# Patient Record
Sex: MALE | Race: WHITE | Employment: FULL TIME | URBAN - METROPOLITAN AREA
[De-identification: names, ages, dates, MRNs, and addresses within clinical notes are randomized per-mention and may not be internally consistent; named-entity substitution may affect disease eponyms.]

---

## 2021-01-26 ENCOUNTER — HOSPITAL ENCOUNTER (EMERGENCY)
Age: 36
Discharge: HOME | End: 2021-01-26
Attending: EMERGENCY MEDICINE

## 2021-01-26 ENCOUNTER — APPOINTMENT (OUTPATIENT)
Dept: GENERAL RADIOLOGY | Age: 36
End: 2021-01-26

## 2021-01-26 VITALS
SYSTOLIC BLOOD PRESSURE: 125 MMHG | HEART RATE: 78 BPM | HEIGHT: 70 IN | TEMPERATURE: 98.2 F | RESPIRATION RATE: 16 BRPM | BODY MASS INDEX: 25.77 KG/M2 | DIASTOLIC BLOOD PRESSURE: 83 MMHG | OXYGEN SATURATION: 99 % | WEIGHT: 180 LBS

## 2021-01-26 DIAGNOSIS — S39.012A STRAIN OF LUMBAR REGION, INITIAL ENCOUNTER: Primary | ICD-10-CM

## 2021-01-26 PROCEDURE — 72100 X-RAY EXAM L-S SPINE 2/3 VWS: CPT

## 2021-01-26 PROCEDURE — 6360000002 HC RX W HCPCS: Performed by: EMERGENCY MEDICINE

## 2021-01-26 PROCEDURE — 96372 THER/PROPH/DIAG INJ SC/IM: CPT

## 2021-01-26 PROCEDURE — 6370000000 HC RX 637 (ALT 250 FOR IP): Performed by: EMERGENCY MEDICINE

## 2021-01-26 PROCEDURE — 99283 EMERGENCY DEPT VISIT LOW MDM: CPT

## 2021-01-26 PROCEDURE — 72170 X-RAY EXAM OF PELVIS: CPT

## 2021-01-26 RX ORDER — KETOROLAC TROMETHAMINE 30 MG/ML
30 INJECTION, SOLUTION INTRAMUSCULAR; INTRAVENOUS ONCE
Status: COMPLETED | OUTPATIENT
Start: 2021-01-26 | End: 2021-01-26

## 2021-01-26 RX ORDER — METHOCARBAMOL 500 MG/1
500 TABLET, FILM COATED ORAL 3 TIMES DAILY PRN
Qty: 10 TABLET | Refills: 0 | Status: SHIPPED | OUTPATIENT
Start: 2021-01-26

## 2021-01-26 RX ORDER — CETIRIZINE HYDROCHLORIDE 10 MG/1
TABLET ORAL DAILY
COMMUNITY

## 2021-01-26 RX ORDER — LIDOCAINE 4 G/G
1 PATCH TOPICAL DAILY
Qty: 10 PATCH | Refills: 0 | Status: SHIPPED | OUTPATIENT
Start: 2021-01-26 | End: 2021-02-05

## 2021-01-26 RX ORDER — LIDOCAINE 4 G/G
1 PATCH TOPICAL ONCE
Status: DISCONTINUED | OUTPATIENT
Start: 2021-01-26 | End: 2021-01-26 | Stop reason: HOSPADM

## 2021-01-26 RX ADMIN — KETOROLAC TROMETHAMINE 30 MG: 30 INJECTION, SOLUTION INTRAMUSCULAR at 10:29

## 2021-01-26 ASSESSMENT — PAIN DESCRIPTION - DESCRIPTORS: DESCRIPTORS: DULL

## 2021-01-26 ASSESSMENT — PAIN DESCRIPTION - LOCATION: LOCATION: BACK

## 2021-01-26 ASSESSMENT — PAIN SCALES - GENERAL: PAINLEVEL_OUTOF10: 7

## 2021-01-26 NOTE — ED TRIAGE NOTES
Chief Complaint   Patient presents with    Back Pain     pt c/o lower back pain x 6 days, denies injury

## 2021-01-26 NOTE — ED PROVIDER NOTES
[address] ED      CHIEF COMPLAINT  Back Pain (pt c/o lower back pain x 6 days, denies injury)       HISTORY OF PRESENT ILLNESS  Freddy Comer is a 28 y.o. male  who presents to the ED for evaluation of back pain. Patient reports he is on his feet a lot coke and says he is not having worsening bilateral lower back pain over the past 6 days. Denies recalling any specific injuries. Reports the pain is worse with movement and prolonged standing. Reports having experienced back pain before, but never this bad. Denies having diabetes. Denies IV drug use. Denies any blood thinner use. Denies having saddle anesthesia, urinary, or bowel incontinence. Denies any recent fevers, chills. Denies any unintentional weight loss. Denies night chills. Denies having any other complaints such as chest pain, shortness of breath, abdominal pain, or any other symptoms. Denies hematuria, dysuria, or urinary frequency/urgency. No other complaints, modifying factors or associated symptoms. I have reviewed the following from the nursing documentation. History reviewed. No pertinent past medical history. Past Surgical History:   Procedure Laterality Date    SKIN GRAFT      VASECTOMY       History reviewed. No pertinent family history. Social History     Socioeconomic History    Marital status: Single     Spouse name: Not on file    Number of children: Not on file    Years of education: Not on file    Highest education level: Not on file   Occupational History    Not on file   Social Needs    Financial resource strain: Not on file    Food insecurity     Worry: Not on file     Inability: Not on file    Transportation needs     Medical: Not on file     Non-medical: Not on file   Tobacco Use    Smoking status: Current Every Day Smoker     Packs/day: 0.50     Types: Cigarettes    Smokeless tobacco: Never Used   Substance and Sexual Activity    Alcohol use: Yes     Comment: daily    Drug use:  Yes Types: Marijuana    Sexual activity: Not on file   Lifestyle    Physical activity     Days per week: Not on file     Minutes per session: Not on file    Stress: Not on file   Relationships    Social connections     Talks on phone: Not on file     Gets together: Not on file     Attends Caodaism service: Not on file     Active member of club or organization: Not on file     Attends meetings of clubs or organizations: Not on file     Relationship status: Not on file    Intimate partner violence     Fear of current or ex partner: Not on file     Emotionally abused: Not on file     Physically abused: Not on file     Forced sexual activity: Not on file   Other Topics Concern    Not on file   Social History Narrative    Not on file     No current facility-administered medications for this encounter. Current Outpatient Medications   Medication Sig Dispense Refill    cetirizine (ZYRTEC) 10 MG tablet Take by mouth daily Pt unsure of dose      methocarbamol (ROBAXIN) 500 MG tablet Take 1 tablet by mouth 3 times daily as needed (pain) 10 tablet 0    lidocaine 4 % external patch Place 1 patch onto the skin daily for 10 doses 10 patch 0    albuterol (PROVENTIL HFA;VENTOLIN HFA) 108 (90 BASE) MCG/ACT inhaler Inhale 2 puffs into the lungs every 6 hours as needed. No Known Allergies    REVIEW OF SYSTEMS  10 systems reviewed, pertinent positives per HPI otherwise noted to be negative. PHYSICAL EXAM  /83   Pulse 78   Temp 98.2 °F (36.8 °C) (Oral)   Resp 16   Ht 5' 10\" (1.778 m)   Wt 180 lb (81.6 kg)   SpO2 99%   BMI 25.83 kg/m²    GENERAL APPEARANCE: Awake and alert. No acute distress. HENT: Normocephalic. Atraumatic. PERRL. EOMI. No facial droop. HEART/CHEST: RRR. LUNGS: Respirations unlabored. Speaking comfortably in full sentences. CTAB. ABDOMEN: Soft, non-distended abdomen. Non tender to palpation. No guarding. No rebound. EXTREMITIES: No gross deformities.  Moving all extremities. BACK: tenderness to palpation over bilateral SI. No midline c/t/l spine stepoffs  SKIN: Warm and dry. No acute rashes. NEUROLOGICAL: Alert and oriented. No gross facial drooping. Answering questions appropriately. Moving all extremities. PSYCHIATRIC: Pleasant. Normal mood and affect. LABS  No results found for this visit on 01/26/21. RADIOLOGY  Xr Lumbar Spine (2-3 Views)    Result Date: 1/26/2021  EXAMINATION: THREE XRAY VIEWS OF THE LUMBAR SPINE; ONE XRAY VIEW OF THE PELVIS 1/26/2021 10:34 am COMPARISON: None. HISTORY: ORDERING SYSTEM PROVIDED HISTORY: lower back pain TECHNOLOGIST PROVIDED HISTORY: Reason for exam:->lower back pain Reason for Exam: back pain since Wednesday no injury Acuity: Acute Type of Exam: Initial; ORDERING SYSTEM PROVIDED HISTORY: back pain TECHNOLOGIST PROVIDED HISTORY: Reason for exam:->back pain Reason for Exam: back pain since Wednesday no injury Acuity: Acute Type of Exam: Initial FINDINGS: Lumbar spine: There is straightening of the normal curvature of the spine with preserved height and alignment. Intervertebral disc spaces preserved. Pelvis: Bony pelvis is intact. No significant degenerative changes noted. Evaluation of the sacrum is somewhat limited secondary to overlying fecal material and bowel gas. No obvious acute osseous abnormality noted. Straightening of the normal curvature of the spine which can be seen with strain or spasm. No obvious acute osseous abnormality noted. If the patient has radicular symptoms MRI would be more sensitive for evaluation of discogenic and nerve root involvement. The pelvis demonstrates no obvious acute abnormality. Evaluation of the sacrum is somewhat limited by overlying bowel gas and fecal material. If there is focal tenderness of the mid to inferior sacrum additional imaging can be obtained as clinically indicated.      Xr Pelvis (1-2 Views)    Result Date: 1/26/2021  EXAMINATION: THREE XRAY VIEWS OF THE LUMBAR SPINE; ONE XRAY VIEW OF THE PELVIS 1/26/2021 10:34 am COMPARISON: None. HISTORY: ORDERING SYSTEM PROVIDED HISTORY: lower back pain TECHNOLOGIST PROVIDED HISTORY: Reason for exam:->lower back pain Reason for Exam: back pain since Wednesday no injury Acuity: Acute Type of Exam: Initial; ORDERING SYSTEM PROVIDED HISTORY: back pain TECHNOLOGIST PROVIDED HISTORY: Reason for exam:->back pain Reason for Exam: back pain since Wednesday no injury Acuity: Acute Type of Exam: Initial FINDINGS: Lumbar spine: There is straightening of the normal curvature of the spine with preserved height and alignment. Intervertebral disc spaces preserved. Pelvis: Bony pelvis is intact. No significant degenerative changes noted. Evaluation of the sacrum is somewhat limited secondary to overlying fecal material and bowel gas. No obvious acute osseous abnormality noted. Straightening of the normal curvature of the spine which can be seen with strain or spasm. No obvious acute osseous abnormality noted. If the patient has radicular symptoms MRI would be more sensitive for evaluation of discogenic and nerve root involvement. The pelvis demonstrates no obvious acute abnormality. Evaluation of the sacrum is somewhat limited by overlying bowel gas and fecal material. If there is focal tenderness of the mid to inferior sacrum additional imaging can be obtained as clinically indicated. ED COURSE/MDM  Patient seen and evaluated. At presentation, patient was alert, afebrile, hemodynamically stable, and satting well on room air. Exam remarkable for tenderness palpation over bilateral SI joint. Patient reported having pain worse with prolonged standing and movement with occasional shooting pain down the back of his legs or buttocks to the mid thigh. Discussed his symptoms are consistent with sciatica. Patient denies having any red flag symptoms and therefore low risk for cauda equina.   Patient also denies any injuries to make me suspect compression fractures or acute osseous deformity. Patient denies any unintentional weight loss with history of cancer and therefore at low risk for pathologic fracture. Given this, x-ray of lumbar spine and x-ray pelvis obtained. Patient given Toradol and lidocaine patch in the emergency department. X-ray showed no acute deformity. Patient given prescription of methocarbamol, lidocaine patches, and given referral for PCP. Patient discharged home with strict return precautions. Pt was seen during the [place] 19 pandemic. Appropriate PPE worn by ME during patient encounters. Pt seen during a time with constrained hospital bed capacity and other potential inpatient and outpatient resources were constrained due to the viral pandemic. During the patient's ED course, the patient was given:  Medications   ketorolac (TORADOL) injection 30 mg (30 mg Intramuscular Given 1/26/21 1029)        CLINICAL IMPRESSION  1. Strain of lumbar region, initial encounter        Blood pressure 125/83, pulse 78, temperature 98.2 °F (36.8 °C), temperature source Oral, resp. rate 16, height 5' 10\" (1.778 m), weight 180 lb (81.6 kg), SpO2 99 %. Jodi Mulligan was discharged home in stable condition. Patient was given scripts for the following medications. I counseled patient how to take these medications. Discharge Medication List as of 1/26/2021 11:42 AM      START taking these medications    Details   methocarbamol (ROBAXIN) 500 MG tablet Take 1 tablet by mouth 3 times daily as needed (pain), Disp-10 tablet, R-0Print      lidocaine 4 % external patch Place 1 patch onto the skin daily for 10 doses, Transdermal, DAILY Starting Tue 1/26/2021, Until Fri 2/5/2021, For 10 doses, Disp-10 patch, R-0, Print             Follow-up with:  Damir Medina  [phone]          DISCLAIMER: This chart was created using Dragon dictation software.   Efforts were made by me to ensure accuracy, however some errors may

## 2023-03-08 ENCOUNTER — APPOINTMENT (OUTPATIENT)
Dept: CT IMAGING | Age: 38
End: 2023-03-08

## 2023-03-08 ENCOUNTER — APPOINTMENT (OUTPATIENT)
Dept: GENERAL RADIOLOGY | Age: 38
End: 2023-03-08

## 2023-03-08 ENCOUNTER — HOSPITAL ENCOUNTER (INPATIENT)
Age: 38
LOS: 2 days | Discharge: HOME | End: 2023-03-10
Attending: EMERGENCY MEDICINE | Admitting: INTERNAL MEDICINE

## 2023-03-08 DIAGNOSIS — R03.0 ELEVATED BLOOD-PRESSURE READING WITHOUT DIAGNOSIS OF HYPERTENSION: ICD-10-CM

## 2023-03-08 DIAGNOSIS — F17.219 CIGARETTE NICOTINE DEPENDENCE WITH NICOTINE-INDUCED DISORDER: ICD-10-CM

## 2023-03-08 DIAGNOSIS — I50.9 ACUTE CONGESTIVE HEART FAILURE, UNSPECIFIED HEART FAILURE TYPE (HCC): Primary | ICD-10-CM

## 2023-03-08 DIAGNOSIS — L03.116 CELLULITIS OF LEFT LOWER EXTREMITY: ICD-10-CM

## 2023-03-08 DIAGNOSIS — J81.0 ACUTE PULMONARY EDEMA (HCC): ICD-10-CM

## 2023-03-08 PROBLEM — I50.43 CHF (CONGESTIVE HEART FAILURE), NYHA CLASS I, ACUTE ON CHRONIC, COMBINED (HCC): Status: ACTIVE | Noted: 2023-03-08

## 2023-03-08 LAB
A/G RATIO: 0.9 (ref 1.1–2.2)
ALBUMIN SERPL-MCNC: 3.6 G/DL (ref 3.4–5)
ALP BLD-CCNC: 77 U/L (ref 40–129)
ALT SERPL-CCNC: 19 U/L (ref 10–40)
ANION GAP SERPL CALCULATED.3IONS-SCNC: 12 MMOL/L (ref 3–16)
AST SERPL-CCNC: 18 U/L (ref 15–37)
BASOPHILS ABSOLUTE: 0.1 K/UL (ref 0–0.2)
BASOPHILS RELATIVE PERCENT: 1 %
BILIRUB SERPL-MCNC: 0.3 MG/DL (ref 0–1)
BUN BLDV-MCNC: 13 MG/DL (ref 7–20)
CALCIUM SERPL-MCNC: 9.2 MG/DL (ref 8.3–10.6)
CHLORIDE BLD-SCNC: 97 MMOL/L (ref 99–110)
CO2: 23 MMOL/L (ref 21–32)
CREAT SERPL-MCNC: 0.9 MG/DL (ref 0.9–1.3)
D DIMER: 3.16 UG/ML FEU (ref 0–0.6)
EKG ATRIAL RATE: 93 BPM
EKG DIAGNOSIS: NORMAL
EKG P AXIS: 42 DEGREES
EKG P-R INTERVAL: 148 MS
EKG Q-T INTERVAL: 362 MS
EKG QRS DURATION: 96 MS
EKG QTC CALCULATION (BAZETT): 450 MS
EKG R AXIS: -24 DEGREES
EKG T AXIS: -3 DEGREES
EKG VENTRICULAR RATE: 93 BPM
EOSINOPHILS ABSOLUTE: 0.2 K/UL (ref 0–0.6)
EOSINOPHILS RELATIVE PERCENT: 1.5 %
GFR SERPL CREATININE-BSD FRML MDRD: >60 ML/MIN/{1.73_M2}
GLUCOSE BLD-MCNC: 145 MG/DL (ref 70–99)
HCT VFR BLD CALC: 35.3 % (ref 40.5–52.5)
HEMOGLOBIN: 11.8 G/DL (ref 13.5–17.5)
INFLUENZA A: NOT DETECTED
INFLUENZA B: NOT DETECTED
LYMPHOCYTES ABSOLUTE: 1.3 K/UL (ref 1–5.1)
LYMPHOCYTES RELATIVE PERCENT: 11.8 %
MCH RBC QN AUTO: 30.8 PG (ref 26–34)
MCHC RBC AUTO-ENTMCNC: 33.4 G/DL (ref 31–36)
MCV RBC AUTO: 92.3 FL (ref 80–100)
MONOCYTES ABSOLUTE: 0.9 K/UL (ref 0–1.3)
MONOCYTES RELATIVE PERCENT: 8.6 %
NEUTROPHILS ABSOLUTE: 8.3 K/UL (ref 1.7–7.7)
NEUTROPHILS RELATIVE PERCENT: 77.1 %
PDW BLD-RTO: 13.1 % (ref 12.4–15.4)
PLATELET # BLD: 393 K/UL (ref 135–450)
PMV BLD AUTO: 8.8 FL (ref 5–10.5)
POTASSIUM SERPL-SCNC: 3.8 MMOL/L (ref 3.5–5.1)
PRO-BNP: 4658 PG/ML (ref 0–124)
RBC # BLD: 3.83 M/UL (ref 4.2–5.9)
SARS-COV-2 RNA, RT PCR: NOT DETECTED
SODIUM BLD-SCNC: 132 MMOL/L (ref 136–145)
TOTAL PROTEIN: 7.4 G/DL (ref 6.4–8.2)
TROPONIN: <0.01 NG/ML
WBC # BLD: 10.8 K/UL (ref 4–11)

## 2023-03-08 PROCEDURE — 36415 COLL VENOUS BLD VENIPUNCTURE: CPT

## 2023-03-08 PROCEDURE — 2580000003 HC RX 258: Performed by: INTERNAL MEDICINE

## 2023-03-08 PROCEDURE — 6360000002 HC RX W HCPCS: Performed by: INTERNAL MEDICINE

## 2023-03-08 PROCEDURE — 84484 ASSAY OF TROPONIN QUANT: CPT

## 2023-03-08 PROCEDURE — 6360000002 HC RX W HCPCS: Performed by: NURSE PRACTITIONER

## 2023-03-08 PROCEDURE — 71260 CT THORAX DX C+: CPT | Performed by: NURSE PRACTITIONER

## 2023-03-08 PROCEDURE — 1200000000 HC SEMI PRIVATE

## 2023-03-08 PROCEDURE — 6360000004 HC RX CONTRAST MEDICATION: Performed by: NURSE PRACTITIONER

## 2023-03-08 PROCEDURE — 6370000000 HC RX 637 (ALT 250 FOR IP): Performed by: NURSE PRACTITIONER

## 2023-03-08 PROCEDURE — 87636 SARSCOV2 & INF A&B AMP PRB: CPT

## 2023-03-08 PROCEDURE — 6370000000 HC RX 637 (ALT 250 FOR IP): Performed by: INTERNAL MEDICINE

## 2023-03-08 PROCEDURE — 96365 THER/PROPH/DIAG IV INF INIT: CPT

## 2023-03-08 PROCEDURE — 71046 X-RAY EXAM CHEST 2 VIEWS: CPT

## 2023-03-08 PROCEDURE — 85025 COMPLETE CBC W/AUTO DIFF WBC: CPT

## 2023-03-08 PROCEDURE — 93010 ELECTROCARDIOGRAM REPORT: CPT | Performed by: INTERNAL MEDICINE

## 2023-03-08 PROCEDURE — 94640 AIRWAY INHALATION TREATMENT: CPT

## 2023-03-08 PROCEDURE — 93005 ELECTROCARDIOGRAM TRACING: CPT | Performed by: NURSE PRACTITIONER

## 2023-03-08 PROCEDURE — 80053 COMPREHEN METABOLIC PANEL: CPT

## 2023-03-08 PROCEDURE — 99285 EMERGENCY DEPT VISIT HI MDM: CPT

## 2023-03-08 PROCEDURE — 85379 FIBRIN DEGRADATION QUANT: CPT

## 2023-03-08 PROCEDURE — 96375 TX/PRO/DX INJ NEW DRUG ADDON: CPT

## 2023-03-08 PROCEDURE — 83880 ASSAY OF NATRIURETIC PEPTIDE: CPT

## 2023-03-08 RX ORDER — SODIUM CHLORIDE 9 MG/ML
INJECTION, SOLUTION INTRAVENOUS PRN
Status: DISCONTINUED | OUTPATIENT
Start: 2023-03-08 | End: 2023-03-10 | Stop reason: HOSPADM

## 2023-03-08 RX ORDER — FUROSEMIDE 10 MG/ML
20 INJECTION INTRAMUSCULAR; INTRAVENOUS ONCE
Status: COMPLETED | OUTPATIENT
Start: 2023-03-08 | End: 2023-03-08

## 2023-03-08 RX ORDER — CEFAZOLIN SODIUM IN 0.9 % NACL 2 G/100 ML
2000 PLASTIC BAG, INJECTION (ML) INTRAVENOUS ONCE
Status: COMPLETED | OUTPATIENT
Start: 2023-03-08 | End: 2023-03-08

## 2023-03-08 RX ORDER — ONDANSETRON 2 MG/ML
4 INJECTION INTRAMUSCULAR; INTRAVENOUS EVERY 6 HOURS PRN
Status: DISCONTINUED | OUTPATIENT
Start: 2023-03-08 | End: 2023-03-10 | Stop reason: HOSPADM

## 2023-03-08 RX ORDER — SODIUM CHLORIDE 0.9 % (FLUSH) 0.9 %
5-40 SYRINGE (ML) INJECTION EVERY 12 HOURS SCHEDULED
Status: DISCONTINUED | OUTPATIENT
Start: 2023-03-08 | End: 2023-03-10 | Stop reason: HOSPADM

## 2023-03-08 RX ORDER — ALBUTEROL SULFATE 90 UG/1
2 AEROSOL, METERED RESPIRATORY (INHALATION) EVERY 4 HOURS PRN
Status: DISCONTINUED | OUTPATIENT
Start: 2023-03-08 | End: 2023-03-10 | Stop reason: HOSPADM

## 2023-03-08 RX ORDER — ACETAMINOPHEN 650 MG/1
650 SUPPOSITORY RECTAL EVERY 6 HOURS PRN
Status: DISCONTINUED | OUTPATIENT
Start: 2023-03-08 | End: 2023-03-10 | Stop reason: HOSPADM

## 2023-03-08 RX ORDER — POLYETHYLENE GLYCOL 3350 17 G/17G
17 POWDER, FOR SOLUTION ORAL DAILY PRN
Status: DISCONTINUED | OUTPATIENT
Start: 2023-03-08 | End: 2023-03-10 | Stop reason: HOSPADM

## 2023-03-08 RX ORDER — ENOXAPARIN SODIUM 100 MG/ML
40 INJECTION SUBCUTANEOUS DAILY
Status: DISCONTINUED | OUTPATIENT
Start: 2023-03-08 | End: 2023-03-10 | Stop reason: HOSPADM

## 2023-03-08 RX ORDER — SODIUM CHLORIDE 0.9 % (FLUSH) 0.9 %
5-40 SYRINGE (ML) INJECTION PRN
Status: DISCONTINUED | OUTPATIENT
Start: 2023-03-08 | End: 2023-03-10 | Stop reason: HOSPADM

## 2023-03-08 RX ORDER — FUROSEMIDE 10 MG/ML
40 INJECTION INTRAMUSCULAR; INTRAVENOUS 3 TIMES DAILY
Status: COMPLETED | OUTPATIENT
Start: 2023-03-08 | End: 2023-03-08

## 2023-03-08 RX ORDER — ONDANSETRON 4 MG/1
4 TABLET, ORALLY DISINTEGRATING ORAL EVERY 8 HOURS PRN
Status: DISCONTINUED | OUTPATIENT
Start: 2023-03-08 | End: 2023-03-10 | Stop reason: HOSPADM

## 2023-03-08 RX ORDER — IPRATROPIUM BROMIDE AND ALBUTEROL SULFATE 2.5; .5 MG/3ML; MG/3ML
1 SOLUTION RESPIRATORY (INHALATION) ONCE
Status: COMPLETED | OUTPATIENT
Start: 2023-03-08 | End: 2023-03-08

## 2023-03-08 RX ORDER — ACETAMINOPHEN 325 MG/1
650 TABLET ORAL EVERY 6 HOURS PRN
Status: DISCONTINUED | OUTPATIENT
Start: 2023-03-08 | End: 2023-03-10 | Stop reason: HOSPADM

## 2023-03-08 RX ADMIN — FUROSEMIDE 40 MG: 10 INJECTION, SOLUTION INTRAMUSCULAR; INTRAVENOUS at 20:35

## 2023-03-08 RX ADMIN — IPRATROPIUM BROMIDE AND ALBUTEROL SULFATE 1 AMPULE: .5; 2.5 SOLUTION RESPIRATORY (INHALATION) at 10:00

## 2023-03-08 RX ADMIN — ACETAMINOPHEN 650 MG: 325 TABLET ORAL at 20:41

## 2023-03-08 RX ADMIN — Medication 2000 MG: at 13:49

## 2023-03-08 RX ADMIN — Medication 10 ML: at 20:35

## 2023-03-08 RX ADMIN — FUROSEMIDE 20 MG: 10 INJECTION, SOLUTION INTRAMUSCULAR; INTRAVENOUS at 11:09

## 2023-03-08 RX ADMIN — ENOXAPARIN SODIUM 40 MG: 100 INJECTION SUBCUTANEOUS at 16:34

## 2023-03-08 RX ADMIN — FUROSEMIDE 40 MG: 10 INJECTION, SOLUTION INTRAMUSCULAR; INTRAVENOUS at 16:34

## 2023-03-08 RX ADMIN — IOPAMIDOL 75 ML: 755 INJECTION, SOLUTION INTRAVENOUS at 12:57

## 2023-03-08 ASSESSMENT — PAIN SCALES - GENERAL: PAINLEVEL_OUTOF10: 2

## 2023-03-08 ASSESSMENT — PAIN - FUNCTIONAL ASSESSMENT: PAIN_FUNCTIONAL_ASSESSMENT: NONE - DENIES PAIN

## 2023-03-08 NOTE — PROGRESS NOTES
Patient admitted to room 354 from ER. Patient oriented to room, call light, bed rails, phone, lights and bathroom. Patient instructed about the schedule of the day including: vital sign frequency, lab draws, possible tests, frequency of MD and staff rounds, including RN/MD rounding together at bedside, daily weights, and I &O's. Patient instructed about prescribed diet, how to use 8MENU, and television. Bed locked, in lowest position, side rails up 2/4, call light within reach. Will continue to monitor.

## 2023-03-08 NOTE — PLAN OF CARE
Received PS from ED regarding Possible Admission , Forwarded Message to  Dr. Abiodun Ladd MD  Hospitalist Physician

## 2023-03-08 NOTE — RT PROTOCOL NOTE
RT Inhaler-Nebulizer Bronchodilator Protocol Note    There is a bronchodilator order in the chart from a provider indicating to follow the RT Bronchodilator Protocol and there is an Initiate RT Inhaler-Nebulizer Bronchodilator Protocol order as well (see protocol at bottom of note). CXR Findings:  No results found. The findings from the last RT Protocol Assessment were as follows:   History Pulmonary Disease: None or smoker <15 pack years  Respiratory Pattern: Regular pattern and RR 12-20 bpm  Breath Sounds: Slightly diminished and/or crackles  Cough: Strong, spontaneous, non-productive  Indication for Bronchodilator Therapy: On home bronchodilators  Bronchodilator Assessment Score: 2    Aerosolized bronchodilator medication orders have been revised according to the RT Inhaler-Nebulizer Bronchodilator Protocol below. Respiratory Therapist to perform RT Therapy Protocol Assessment initially then follow the protocol. Repeat RT Therapy Protocol Assessment PRN for score 0-3 or on second treatment, BID, and PRN for scores above 3. No Indications - adjust the frequency to every 6 hours PRN wheezing or bronchospasm, if no treatments needed after 48 hours then discontinue using Per Protocol order mode. If indication present, adjust the RT bronchodilator orders based on the Bronchodilator Assessment Score as indicated below. Use Inhaler orders unless patient has one or more of the following: on home nebulizer, not able to hold breath for 10 seconds, is not alert and oriented, cannot activate and use MDI correctly, or respiratory rate 25 breaths per minute or more, then use the equivalent nebulizer order(s) with same Frequency and PRN reasons based on the score. If a patient is on this medication at home then do not decrease Frequency below that used at home.     0-3 - enter or revise RT bronchodilator order(s) to equivalent RT Bronchodilator order with Frequency of every 4 hours PRN for wheezing or increased work of breathing using Per Protocol order mode. 4-6 - enter or revise RT Bronchodilator order(s) to two equivalent RT bronchodilator orders with one order with BID Frequency and one order with Frequency of every 4 hours PRN wheezing or increased work of breathing using Per Protocol order mode. 7-10 - enter or revise RT Bronchodilator order(s) to two equivalent RT bronchodilator orders with one order with TID Frequency and one order with Frequency of every 4 hours PRN wheezing or increased work of breathing using Per Protocol order mode. 11-13 - enter or revise RT Bronchodilator order(s) to one equivalent RT bronchodilator order with QID Frequency and an Albuterol order with Frequency of every 4 hours PRN wheezing or increased work of breathing using Per Protocol order mode. Greater than 13 - enter or revise RT Bronchodilator order(s) to one equivalent RT bronchodilator order with every 4 hours Frequency and an Albuterol order with Frequency of every 2 hours PRN wheezing or increased work of breathing using Per Protocol order mode. RT to enter RT Home Evaluation for COPD & MDI Assessment order using Per Protocol order mode.     Electronically signed by Lawanda Dumont RCP on 3/8/2023 at 4:28 PM

## 2023-03-08 NOTE — CARE COORDINATION
Case Management Assessment  Initial Evaluation    Date/Time of Evaluation: 3/8/2023 2:07 PM  Assessment Completed by: SUSI Knight    If patient is discharged prior to next notation, then this note serves as note for discharge by case management. Patient Name: Maximino Sal                   YOB: 1985  Diagnosis: No admission diagnoses are documented for this encounter. Date / Time: 3/8/2023  8:52 AM    Patient Admission Status: Emergency   Readmission Risk (Low < 19, Mod (19-27), High > 27): No data recorded  Current PCP: No primary care provider on file. PCP verified by CM? (P) Yes    Chart Reviewed: Yes      History Provided by: (P) Patient  Patient Orientation: (P) Alert and Oriented    Patient Cognition: (P) Alert    Hospitalization in the last 30 days (Readmission):  No    If yes, Readmission Assessment in CM Navigator will be completed.     Advance Directives:      Code Status: Not on file   Patient's Primary Decision Maker is: (P) Legal Next of Kin    Primary Decision Maker: Roberta Upton [facility] - [phone]    Discharge Planning:    Patient lives with: (P) Alone Type of Home: (P) Apartment  Primary Care Giver: (P) Self  Patient Support Systems include: (P) Parent, Friends/Neighbors, Family Members   Current Financial resources: (P) Financial Counseling  Current community resources: (P) Health Education  Current services prior to admission: (P) None            Current DME:              Type of Home Care services:       ADLS  Prior functional level: (P) Independent in ADLs/IADLs  Current functional level: (P) Independent in ADLs/IADLs    PT AM-PAC:   /24  OT AM-PAC:   /24    Family can provide assistance at DC: (P) Yes  Would you like Case Management to discuss the discharge plan with any other family members/significant others, and if so, who? (P) No  Plans to Return to Present Housing: (P) Yes  Other Identified Issues/Barriers to RETURNING to current housing: none noted  Potential Assistance needed at discharge: (P) Prescription Assistance            Potential DME:    Patient expects to discharge to: (P) [address] [facility] [place] for transportation at discharge:      Financial    Payor: /     Does insurance require precert for SNF: No    Potential assistance Purchasing Medications: (P) Yes  Meds-to-Beds request:        [facility] [id number], [address] [facility] [phone] - F [phone] [address] [facility] [address]  Phone: [phone] Fax: [phone]      Notes:    Factors facilitating achievement of predicted outcomes: Family support, Motivated, Cooperative, and Pleasant    Barriers to discharge: Medical complications and No insurance coverage    Additional Case Management Notes: Referred to patient for d/c planning. Spoke to patient. Patient is a 40year old male admitted for CHF. Patient usually lives at home alone. Patient reports he is independent in ADLs. Patient works but has not gotten insurance yet. Patient with no PCP. Gave information on [facility] and good rx card. Referral also made to financial counselor. Referral to CHF nurse for education. Discussed alcohol and substance use. Patient reports he smokes marijuana 1-2 days and drinks 6 beers daily. Patient denies these as a problem and declined any treatment resources. No other needs at this time. The Plan for Transition of Care is related to the following treatment goals of No admission diagnoses are documented for this encounter. IF APPLICABLE: The Patient and/or patient representative Abram Leyva and his family were provided with a choice of provider and agrees with the discharge plan.  Freedom of choice list with basic dialogue that supports the patient's individualized plan of care/goals and shares the quality data associated with the providers was provided to:     Patient Representative Name:       The Patient and/or Patient Representative Agree with the Discharge Plan?       SUSI Goss, LESLIEW-S  Case Management Department  Ph: [phone] Fax: [phone]

## 2023-03-08 NOTE — RT PROTOCOL NOTE
RT Inhaler-Nebulizer Bronchodilator Protocol Note    There is a bronchodilator order in the chart from a provider indicating to follow the RT Bronchodilator Protocol and there is an Initiate RT Inhaler-Nebulizer Bronchodilator Protocol order as well (see protocol at bottom of note). CXR Findings:  No results found. The findings from the last RT Protocol Assessment were as follows:   History Pulmonary Disease: None or smoker <15 pack years  Respiratory Pattern: Regular pattern and RR 12-20 bpm  Breath Sounds: Slightly diminished and/or crackles  Cough: Strong, spontaneous, non-productive  Indication for Bronchodilator Therapy: On home bronchodilators  Bronchodilator Assessment Score: 2    Aerosolized bronchodilator medication orders have been revised according to the RT Inhaler-Nebulizer Bronchodilator Protocol below. Respiratory Therapist to perform RT Therapy Protocol Assessment initially then follow the protocol. Repeat RT Therapy Protocol Assessment PRN for score 0-3 or on second treatment, BID, and PRN for scores above 3. No Indications - adjust the frequency to every 6 hours PRN wheezing or bronchospasm, if no treatments needed after 48 hours then discontinue using Per Protocol order mode. If indication present, adjust the RT bronchodilator orders based on the Bronchodilator Assessment Score as indicated below. Use Inhaler orders unless patient has one or more of the following: on home nebulizer, not able to hold breath for 10 seconds, is not alert and oriented, cannot activate and use MDI correctly, or respiratory rate 25 breaths per minute or more, then use the equivalent nebulizer order(s) with same Frequency and PRN reasons based on the score. If a patient is on this medication at home then do not decrease Frequency below that used at home.     0-3 - enter or revise RT bronchodilator order(s) to equivalent RT Bronchodilator order with Frequency of every 4 hours PRN for wheezing or increased work of breathing using Per Protocol order mode. 4-6 - enter or revise RT Bronchodilator order(s) to two equivalent RT bronchodilator orders with one order with BID Frequency and one order with Frequency of every 4 hours PRN wheezing or increased work of breathing using Per Protocol order mode. 7-10 - enter or revise RT Bronchodilator order(s) to two equivalent RT bronchodilator orders with one order with TID Frequency and one order with Frequency of every 4 hours PRN wheezing or increased work of breathing using Per Protocol order mode. 11-13 - enter or revise RT Bronchodilator order(s) to one equivalent RT bronchodilator order with QID Frequency and an Albuterol order with Frequency of every 4 hours PRN wheezing or increased work of breathing using Per Protocol order mode. Greater than 13 - enter or revise RT Bronchodilator order(s) to one equivalent RT bronchodilator order with every 4 hours Frequency and an Albuterol order with Frequency of every 2 hours PRN wheezing or increased work of breathing using Per Protocol order mode. RT to enter RT Home Evaluation for COPD & MDI Assessment order using Per Protocol order mode.     Electronically signed by Darshan Yañez RCP on 3/8/2023 at 4:27 PM

## 2023-03-08 NOTE — ACP (ADVANCE CARE PLANNING)
Advance Care Planning     General Advance Care Planning (ACP) Conversation    Date of Conversation: 3/8/2023  Conducted with: Patient with Decision Making Capacity    Healthcare Decision Maker:  No healthcare decision makers have been documented. Click here to complete [address] including selection of the Healthcare Decision Maker Relationship (ie \"Primary\")   Today we documented Decision Maker(s) consistent with Legal Next of Kin hierarchy.     Content/Action Overview:  Has NO ACP documents/care preferences - information provided, considering goals and options          Length of Voluntary ACP Conversation in minutes:  <16 minutes (Non-Billable)    SUSI Rojas

## 2023-03-08 NOTE — PROGRESS NOTES
4 Eyes Skin Assessment     The patient is being assess for  Admission    I agree that 2 RN's have performed a thorough Head to Toe Skin Assessment on the patient. ALL assessment sites listed below have been assessed. Areas assessed by both nurses: Yes  [x]   Head, Face, and Ears   [x]   Shoulders, Back, and Chest  [x]   Arms, Elbows, and Hands   [x]   Coccyx, Sacrum, and Ischum  [x]   Legs, Feet, and Heels, left lower extremity red and swollen        Does the Patient have Skin Breakdown?   No         Maury Prevention initiated:  NA   Wound Care Orders initiated:  NA      [facility] nurse consulted for Pressure Injury (Stage 3,4, Unstageable, DTI, NWPT, and Complex wounds):  NA      Nurse 1 eSignature: Electronically signed by Licha Chin RN on 3/8/23 at 4:15 PM EST    **SHARE this note so that the co-signing nurse is able to place an eSignature**    Nurse 2 eSignature: Electronically signed by Vladislav Love RN on 3/8/23 at 4:50 PM EST

## 2023-03-08 NOTE — PLAN OF CARE
Problem: Discharge Planning  Goal: Discharge to home or other facility with appropriate resources  Outcome: Progressing  Flowsheets (Taken 3/8/2023 1621)  Discharge to home or other facility with appropriate resources:   Identify barriers to discharge with patient and caregiver   Arrange for needed discharge resources and transportation as appropriate   Identify discharge learning needs (meds, wound care, etc)

## 2023-03-08 NOTE — ED NOTES
Pt ambulated independently to the restroom. No Complaints of Shortness of breath or dizziness.      Henna East  03/08/23 103

## 2023-03-08 NOTE — ED PROVIDER NOTES
[address] [place] [address]  ED  [address] [address]  Dept: [phone]  Dept Fax: [phone]  Loc: [facility] [address] [place] [address]    Chief Complaint   Patient presents with    Leg Swelling     Pt reporting bilateral leg swelling for the past several days. States he's recently getting over a chest cold with SOB and has a hx of asthma and is a smoker. Looked up symptoms on the intranet and decided it was important to [employer] checked out\"     Shortness of Breath       HPI    Jerry Escalera is a 40 y.o. male who is presenting to the emergency department with a complaint of leg swelling over the last 2 to 3 weeks. He states that he has a history of asthma and he has been feeling short of breath over the last week or so. He states he is now having difficulty laying flat while he is sleeping as he wakes up short of breath and coughing. He is a cigarette smoker. He denies lightheadedness, dizziness, chest pain, body aches, fever, chills. He denies history of hypertension or heart failure although he has not seen his primary care physician he was at [place] [address] in at least the last 3 or 4 years. REVIEW OF SYSTEMS    Cardiac: no chest pain, no palpitations, no syncope  Respiratory: see HPI, + cough  Neurologic: no syncope or LOC  GI: no vomiting, no diarrhea  General: no fever  All other systems reviewed and are negative.     PAST MEDICAL & SURGICAL HISTORY    Past Medical History:   Diagnosis Date    Asthma      Past Surgical History:   Procedure Laterality Date    SKIN GRAFT      VASECTOMY         CURRENT MEDICATIONS  (may include discharge medications prescribed in the ED)  REM      ALLERGIES    No Known Allergies    SOCIAL & FAMILY HISTORY    Social History     Socioeconomic History    Marital status: Single   Tobacco Use    Smoking status: Every Day     Packs/day: 1.00     Types: Cigarettes    Smokeless tobacco: Never Substance and Sexual Activity    Alcohol use: Yes     Comment: daily    Drug use: Yes     Types: Marijuana (Weed)     No family history on file. PHYSICAL EXAM    VITAL SIGNS: BP (!) 167/100   Pulse 93   Temp 98.1 °F (36.7 °C) (Oral)   Resp 20   Ht 5' 10\" (1.778 m)   Wt 215 lb 3 oz (97.6 kg)   SpO2 98%   BMI 30.88 kg/m²    Constitutional:  Well developed, well nourished, no acute distress   HENT:  Atraumatic, moist mucus membranes  Neck: supple, no JVD   Respiratory: Respirations are even and unlabored. Cough is congested. Nonproductive. Expiratory wheezes throughout. Cardiovascular: Regular rhythm and rate, S1-S2  Vascular: Radial and DP pulses 2+ and equal bilaterally  GI:  Soft, nontender, normal bowel sounds  Musculoskeletal: 3+ pitting bilateral lower extremity edema, no lower extremity asymmetry, no calf tenderness, no thigh tenderness, no acute deformities  Integument:  Skin is warm and dry, no petechiae, he has erythema that has developed to the anterior left ankle it is not circumferential.  It is painful with palpation and the skin appears to be intact. Neurologic:  Alert & oriented, no slurred speech  Psych: Pleasant affect, no hallucinations    EKG    I have reviewed and interpreted today's EKG with the following findings:  Ventricular rate 93 bpm, parable 148 ms, QRS duration 96 ms, QTc 450 ms  No ST elevation or depression. T wave inversion in lead III. Interpretation is a normal sinus rhythm with right incomplete bundle branch block    No old EKG available for comparison    RADIOLOGY/PROCEDURES    CT CHEST PULMONARY EMBOLISM W CONTRAST   Final Result   1. Negative for pulmonary embolus. 2. CHF with pulmonary edema and or atypical multifocal pneumonia.          XR CHEST (2 VW)   Final Result   No acute cardiopulmonary findings           Labs Reviewed   CBC WITH AUTO DIFFERENTIAL - Abnormal; Notable for the following components:       Result Value    RBC 3.83 (*)     Hemoglobin 11.8 (*)     Hematocrit 35.3 (*)     Neutrophils Absolute 8.3 (*)     All other components within normal limits   COMPREHENSIVE METABOLIC PANEL - Abnormal; Notable for the following components:    Sodium 132 (*)     Chloride 97 (*)     Glucose 145 (*)     Albumin/Globulin Ratio 0.9 (*)     All other components within normal limits   BRAIN NATRIURETIC PEPTIDE - Abnormal; Notable for the following components:    Pro-BNP 4,658 (*)     All other components within normal limits   D-DIMER, QUANTITATIVE - Abnormal; Notable for the following components:    D-Dimer, Quant 3.16 (*)     All other components within normal limits   COVID-19 & INFLUENZA COMBO   TROPONIN   CBC   RENAL FUNCTION PANEL   MAGNESIUM         ED COURSE & MEDICAL DECISION MAKING    Pertinent Labs & Imaging studies reviewed and interpreted. (See chart for details)    See chart for details of medications given during the ED stay.     Vitals:    03/08/23 1112 03/08/23 1346 03/08/23 1519 03/08/23 1611   BP: (!) 163/116 (!) 159/103 (!) 155/101 (!) 167/100   Pulse: 98 84 75 93   Resp: 21 12 23 20   Temp:    98.1 °F (36.7 °C)   TempSrc:    Oral   SpO2: 97% 97% 94% 98%   Weight:    215 lb 3 oz (97.6 kg)   Height:    5' 10\" (1.778 m)     Medications   albuterol sulfate HFA (PROVENTIL;VENTOLIN;PROAIR) 108 (90 Base) MCG/ACT inhaler 2 puff (has no administration in time range)   sodium chloride flush 0.9 % injection 5-40 mL (has no administration in time range)   sodium chloride flush 0.9 % injection 5-40 mL (has no administration in time range)   0.9 % sodium chloride infusion (has no administration in time range)   enoxaparin (LOVENOX) injection 40 mg (40 mg SubCUTAneous Given 3/8/23 1634)   ondansetron (ZOFRAN-ODT) disintegrating tablet 4 mg (has no administration in time range)     Or   ondansetron (ZOFRAN) injection 4 mg (has no administration in time range)   polyethylene glycol (GLYCOLAX) packet 17 g (has no administration in time range)   acetaminophen (TYLENOL) tablet 650 mg (has no administration in time range)     Or   acetaminophen (TYLENOL) suppository 650 mg (has no administration in time range)   furosemide (LASIX) injection 40 mg (40 mg IntraVENous Given 3/8/23 1634)   perflutren lipid microspheres (DEFINITY) injection 1.5 mL (has no administration in time range)   ipratropium-albuterol (DUONEB) nebulizer solution 1 ampule (1 ampule Inhalation Given 3/8/23 1000)   furosemide (LASIX) injection 20 mg (20 mg IntraVENous Given 3/8/23 1109)   iopamidol (ISOVUE-370) 76 % injection 75 mL (75 mLs IntraVENous Given 3/8/23 1257)   ceFAZolin (ANCEF) 2000 mg in 0.9% sodium chloride 100 mL IVPB (0 mg IntraVENous Stopped 3/8/23 1428)     This patient was seen and evaluated by myself and my attending physician Dr. Rolan Leon    Differential diagnosis includes but is not limited to new onset heart failure, pulmonary edema, ACS, thoracic aortic dissection, COVID, influenza, pneumonia, other    He is nontoxic in appearance and afebrile    2 to 3-week complaint of worsening leg swelling which is new for him. Over the last week he is feeling short of breath and difficulty sleeping at night after laying flat. He has not seen a primary care provider in 3 to 4 years    He has a history of asthma and continues to smoke    White blood cell count 10.8  H&H 11.8 and 35.3    Sodium 132, potassium 3.8  BUN 13, creatinine 0.9    Serum glucose 145    proBNP greater than 46  Troponin less than 0.01  EKG without acute change    Influenza negative  COVID-negative    D-dimer elevated at 3.16.  CT chest PE study ordered to rule out PE    I have reviewed today's imaging studies and I agree with the radiologist interpretation that showed     CT CHEST PULMONARY EMBOLISM W CONTRAST   Final Result   1. Negative for pulmonary embolus. 2. CHF with pulmonary edema and or atypical multifocal pneumonia.          XR CHEST (2 VW)   Final Result   No acute cardiopulmonary findings           He was given a nebulizer treatment for the wheezing. Wheezes had cleared. He is in no respiratory distress. He is not hypoxic. Ambulated him. Again he did not have respiratory distress or hypoxia during ambulation but he did become tachycardic    He does not have insurance    He is an at risk patient because he does not have insurance    And for these reasons I am not requesting hospitalization for probable untreated hypertension and new onset heart failure. He was given 20 mg of IV Lasix. Strict I&O  He diuresed 1 L of urine at the time of this dictation    He was given Ancef for concern of left lower extremity cellulitis developing    Chronic conditions affecting care:  has a past medical history of Asthma. Social Determinants of Health: no health insurance, hasn't seen his provider in years, cigarette smoker    Goals of Care and code status Discussed: agrees with the hospitalization, Full code    Smoking Status: I spent greater than 3 minutes educating the patient on the health benefits of smoking cessation. The patient states they are ready to quit smoking. Consults placed: I spoke with the  regarding this patient, HF nurse was consulted, and the hospitalist was PerfectServed for admission    CRITICAL CARE NOTE:  There was a high probability of clinically significant life-threatening deterioration of the patient's condition requiring my urgent intervention. I personally saw the patient and independently provided 35 minutes of non-concurrent critical care out of the total shared critical care time provided. This includes multiple reevaluations, vital sign monitoring, pulse oximetry monitoring, telemetry monitoring, clinical response to the IV medications, reviewing the nursing notes, consultation time, dictation/documentation time, and interpretation of the labwork. (This time excludes time spent performing procedures). FINAL IMPRESSION    1.  Acute congestive heart failure, unspecified heart failure type ([facility]    2. Acute pulmonary edema (HCC)    3. Cellulitis of left lower extremity    4. Cigarette nicotine dependence with nicotine-induced disorder    5.  Elevated blood-pressure reading without diagnosis of hypertension        PLAN  Admission to the hospital    (Please note that this note was completed with a voice recognition program.  Every attempt was made to edit the dictations, but inevitably there remain words that are mis-transcribed.)        LASHAY Milan - BIPIN  03/08/23 2023

## 2023-03-09 PROBLEM — I50.9 CHF (CONGESTIVE HEART FAILURE) (HCC): Status: ACTIVE | Noted: 2023-03-09

## 2023-03-09 LAB
ALBUMIN SERPL-MCNC: 3.2 G/DL (ref 3.4–5)
ANION GAP SERPL CALCULATED.3IONS-SCNC: 9 MMOL/L (ref 3–16)
BUN BLDV-MCNC: 14 MG/DL (ref 7–20)
CALCIUM SERPL-MCNC: 8.5 MG/DL (ref 8.3–10.6)
CHLORIDE BLD-SCNC: 101 MMOL/L (ref 99–110)
CO2: 27 MMOL/L (ref 21–32)
CREAT SERPL-MCNC: 1.1 MG/DL (ref 0.9–1.3)
GFR SERPL CREATININE-BSD FRML MDRD: >60 ML/MIN/{1.73_M2}
GLUCOSE BLD-MCNC: 97 MG/DL (ref 70–99)
HCT VFR BLD CALC: 34.5 % (ref 40.5–52.5)
HEMOGLOBIN: 11.7 G/DL (ref 13.5–17.5)
LV EF: 58 %
LVEF MODALITY: NORMAL
MAGNESIUM: 1.8 MG/DL (ref 1.8–2.4)
MCH RBC QN AUTO: 31 PG (ref 26–34)
MCHC RBC AUTO-ENTMCNC: 33.8 G/DL (ref 31–36)
MCV RBC AUTO: 91.5 FL (ref 80–100)
PDW BLD-RTO: 13.4 % (ref 12.4–15.4)
PHOSPHORUS: 2.7 MG/DL (ref 2.5–4.9)
PLATELET # BLD: 340 K/UL (ref 135–450)
PMV BLD AUTO: 8.5 FL (ref 5–10.5)
POTASSIUM SERPL-SCNC: 3.8 MMOL/L (ref 3.5–5.1)
RBC # BLD: 3.78 M/UL (ref 4.2–5.9)
SODIUM BLD-SCNC: 137 MMOL/L (ref 136–145)
WBC # BLD: 6.5 K/UL (ref 4–11)

## 2023-03-09 PROCEDURE — 6360000002 HC RX W HCPCS: Performed by: INTERNAL MEDICINE

## 2023-03-09 PROCEDURE — 6370000000 HC RX 637 (ALT 250 FOR IP): Performed by: INTERNAL MEDICINE

## 2023-03-09 PROCEDURE — 85027 COMPLETE CBC AUTOMATED: CPT

## 2023-03-09 PROCEDURE — 80069 RENAL FUNCTION PANEL: CPT

## 2023-03-09 PROCEDURE — 94640 AIRWAY INHALATION TREATMENT: CPT

## 2023-03-09 PROCEDURE — 93306 TTE W/DOPPLER COMPLETE: CPT

## 2023-03-09 PROCEDURE — 83735 ASSAY OF MAGNESIUM: CPT

## 2023-03-09 PROCEDURE — 1200000000 HC SEMI PRIVATE

## 2023-03-09 PROCEDURE — 36415 COLL VENOUS BLD VENIPUNCTURE: CPT

## 2023-03-09 PROCEDURE — 2580000003 HC RX 258: Performed by: INTERNAL MEDICINE

## 2023-03-09 RX ORDER — FUROSEMIDE 10 MG/ML
40 INJECTION INTRAMUSCULAR; INTRAVENOUS 3 TIMES DAILY
Status: COMPLETED | OUTPATIENT
Start: 2023-03-09 | End: 2023-03-09

## 2023-03-09 RX ADMIN — FUROSEMIDE 40 MG: 10 INJECTION, SOLUTION INTRAMUSCULAR; INTRAVENOUS at 14:38

## 2023-03-09 RX ADMIN — FUROSEMIDE 40 MG: 10 INJECTION, SOLUTION INTRAMUSCULAR; INTRAVENOUS at 20:28

## 2023-03-09 RX ADMIN — ACETAMINOPHEN 650 MG: 325 TABLET ORAL at 14:39

## 2023-03-09 RX ADMIN — Medication 2 PUFF: at 04:33

## 2023-03-09 RX ADMIN — ENOXAPARIN SODIUM 40 MG: 100 INJECTION SUBCUTANEOUS at 09:11

## 2023-03-09 RX ADMIN — Medication 10 ML: at 20:28

## 2023-03-09 RX ADMIN — Medication 10 ML: at 09:11

## 2023-03-09 ASSESSMENT — PAIN SCALES - GENERAL: PAINLEVEL_OUTOF10: 2

## 2023-03-09 ASSESSMENT — PAIN DESCRIPTION - LOCATION: LOCATION: HEAD

## 2023-03-09 ASSESSMENT — PAIN DESCRIPTION - DESCRIPTORS: DESCRIPTORS: ACHING

## 2023-03-09 NOTE — PROGRESS NOTES
Patient's EF (Ejection Fraction) is greater than 40%    Heart Failure Medications:  Diuretics[de-identified] Furosemide    (One of the following REQUIRED for EF </= 40%/SYSTOLIC FAILURE but MAY be used in EF% >40%/DIASTOLIC FAILURE)        ACE[de-identified] None        ARB[de-identified] None         ARNI[de-identified] None    (Beta Blockers)  NON- Evidenced Based Beta Blocker (for EF% >40%/DIASTOLIC FAILURE): None    Evidenced Based Beta Blocker::(REQUIRED for EF% <40%/SYSTOLIC FAILURE) None  . .................................................................................................................................................. Patient's weights and intake/output reviewed: Yes    Patient's Last Weight: 208 lbs obtained by standing scale. Difference of 7 lbs less than last documented weight. Intake/Output Summary (Last 24 hours) at 3/9/2023 1516  Last data filed at 3/9/2023 1434  Gross per 24 hour   Intake 960 ml   Output 2425 ml   Net -1465 ml       Daily Weight log at bedside and being used: [employer] Provided: yes    Comorbidities Reviewed Yes    Patient has a past medical history of Asthma. >>For CHF and Comorbidity documentation on Education Time and Topics, please see Education Tab    Progressive Mobility Assessment:  What is this patient's Current Level of Mobility?: Ambulatory-Up Ad Rhianna  How was this patient Mobilized today?: Edge of Bed, Up to Chair,  Up to Toilet/Shower, and Up in Room, ambulated 10 ft                 With Whom? Nurse, PCA, and Self                 Level of Difficulty/Assistance: Independent     Pt up in chair at this time on room air. Pt denies shortness of breath. Pt with nonpitting lower extremity edema.      Patient and/or Family's stated Goal of Care this Admission: reduce shortness of breath, increase activity tolerance, better understand heart failure and disease management, be more comfortable, and reduce lower extremity edema prior to discharge        :

## 2023-03-09 NOTE — DISCHARGE INSTRUCTIONS
Heart Failure Resources:    Heart Failure Interactive Workbook:   Go to www.[email or web address].com/aha-heartfailure for a Free Heart Failure Interactive Workbook provided by [employer]. This interactive workbook will provide information on Healthier Living with Heart Failure. Please copy and paste link into search bar. Use your mouse to scroll through the pages. HF [place] rachel:   Heart Failure Free smart phone rachel available for iPhone and Android download. Use your phone to track sodium intake, fluid intake, symptoms, and weight. Low Sodium Diet:  Go to www. [employer]. [employer] website for [employer] which is Low Sodium! [employer] is a dialysis company, but this website offers free seasonal cookbooks. Each quarter, they will release 25-30 new recipes with a breakdown of calories, sodium, and glucose. Recipes:   Go to www.[employer]/recipes website for free recipes. Home Exercise Program:     -Identification of Green/yellow/Red zones. You should be able to identify when you feel good (green zone), if you have 1-2 symptoms of HF (yellow zone), or if you are in need of medical attention (red zone). In your CHF education folder you were provided a stop light tool to outline this information.     -We want to you to rate your exertion levels. Our therapy team has discussed means of identification with you such as the \"Trini scale. \"  The Trini rating scale ranges from 6 to 20, where 6 means \"no exertion at all\" and 20 means \"maximal exertion. \" The goal is to use this to gauge how much effort it is taking for you to do your normal daily tasks. You should be able to recognize when too much exertion is being expended.    -Elements of Energy Conservation  Prioritize/Plan: Decide what needs to be done today, and what can wait for a later date, write to do lists, Plan ahead to avoid extra trips, Gather supplies and equipment needed before starting an activity.    Position: Avoid tiring and awkward posture that may impair breathing  Pace: Slow and steady pace, never rushing! Pursed lip breathing.   Pursed Lip Breathing: \"Smell the roses, blow out the candles\"

## 2023-03-09 NOTE — PLAN OF CARE
Problem: Discharge Planning  Goal: Discharge to home or other facility with appropriate resources  3/9/2023 0930 by Gerardo Doty RN  Outcome: Progressing  3/8/2023 2232 by Abbie Akhtar RN  Outcome: Progressing     Problem: Cardiovascular - Adult  Goal: Maintains optimal cardiac output and hemodynamic stability  3/9/2023 0930 by Gerardo Doty RN  Outcome: Progressing  3/8/2023 2232 by Abbie Akhtar RN  Outcome: Progressing  Goal: Absence of cardiac dysrhythmias or at baseline  3/9/2023 0930 by Gerardo Doty RN  Outcome: Progressing  3/8/2023 2232 by Abbie Akhtar RN  Outcome: Progressing  Note: On cardiac monitoring     Problem: Metabolic/Fluid and Electrolytes - Adult  Goal: Electrolytes maintained within normal limits  3/9/2023 0930 by Gerardo Doty RN  Outcome: Progressing  3/8/2023 2232 by Abbie Akhtar RN  Outcome: Progressing  Goal: Hemodynamic stability and optimal renal function maintained  3/9/2023 0930 by Gerardo Doty RN  Outcome: Progressing  3/8/2023 2232 by Abbie Akhtar RN  Outcome: Progressing     Problem: Chronic Conditions and Co-morbidities  Goal: Patient's chronic conditions and co-morbidity symptoms are monitored and maintained or improved  Outcome: Progressing

## 2023-03-09 NOTE — PROGRESS NOTES
Assessment done and recorded. Vital signs stable. A&O x4 . Safety measures in place. Bed locked, alarmed and placed in lowest position. Bedside table and call light within reach. Will continue monitoring pt.

## 2023-03-09 NOTE — H&P
Hospital Medicine History & Physical      PCP: No primary care provider on file. Date of Admission: 3/8/2023    Date of Service: Pt seen/examined on 9 March and Admitted to Inpatient with expected LOS greater than two midnights due to medical therapy. Chief Complaint:  SOB w/ Bilateral LE edema      History Of Present Illness:        40 y.o. male w/out prior cardiac hx who presented to [facility] with a week-long hx of increasing LE edema w/ SOB/ESPINOSA and PND. He denies any associated CP/Palpitations. No family/personal hx of blood clots. He reports Etoh intake of 6-8/day. He has had good diuresis w/ IV Lasix since admission. The patient denies any fever/chills or other signs/sxs of systemic illness or identifiable aggravating/alleviating factors. Past Medical History:          Diagnosis Date    Asthma        Past Surgical History:          Procedure Laterality Date    SKIN GRAFT      VASECTOMY         Medications Prior to Admission:      Prior to Admission medications    Medication Sig Start Date End Date Taking? Authorizing Provider   albuterol (PROVENTIL HFA;VENTOLIN HFA) 108 (90 BASE) MCG/ACT inhaler Inhale 2 puffs into the lungs every 6 hours as needed. Yes Historical Provider, MD       Allergies:  Patient has no known allergies. Social History:      The patient currently lives independently    TOBACCO:   reports that he has been smoking cigarettes. He has been smoking an average of 1 pack per day. He has never used smokeless tobacco.  ETOH:   reports current alcohol use. Family History:      Reviewed in detail and negative for DM, CAD, Cancer, CVA. Positive as follows:    No family history on file. REVIEW OF SYSTEMS:   Pertinent positives as noted in the HPI. All other systems reviewed and negative.     PHYSICAL EXAM:    BP (!) 137/92   Pulse 86   Temp 98.2 °F (36.8 °C) (Oral)   Resp 18   Ht 5' 10\" (1.778 m)   Wt 208 lb (94.3 kg)   SpO2 95%   BMI 29.84 kg/m² General appearance:  No apparent distress, appears stated age and cooperative. HEENT:  Normal cephalic, atraumatic without obvious deformity. Pupils equal, round, and reactive to light. Extra ocular muscles intact. Conjunctivae/corneas clear. Neck: Supple, with full range of motion. No jugular venous distention. Trachea midline. Respiratory:  Normal respiratory effort. Clear to auscultation, bilaterally without Rales/Wheezes/Rhonchi. Cardiovascular:  Regular rate and rhythm with normal S1/S2 without murmurs, rubs or gallops. Abdomen: Soft, non-tender, non-distended with normal bowel sounds. Musculoskeletal:  No clubbing, cyanosis or edema bilaterally. Full range of motion without deformity. Skin: Skin color, texture, turgor normal.  No rashes or lesions. Neurologic:  Neurovascularly intact without any focal sensory/motor deficits. Cranial nerves: II-XII intact, grossly non-focal.  Psychiatric:  Alert and oriented, thought content appropriate, normal insight  Capillary Refill: Brisk,< 3 seconds   Peripheral Pulses: +2 palpable, equal bilaterally       CXR:  I have reviewed the CXR with the following interpretation: N/A  EKG:  I have reviewed the EKG with the following interpretation: No acute ischemic changes     Labs:     Recent Labs     03/08/23 0904 03/09/23 0627   WBC 10.8 6.5   HGB 11.8* 11.7*   HCT 35.3* 34.5*    340     Recent Labs     03/08/23 0904 03/09/23 0627   * 137   K 3.8 3.8   CL 97* 101   CO2 23 27   BUN 13 14   CREATININE 0.9 1.1   CALCIUM 9.2 8.5   PHOS  --  2.7     Recent Labs     03/08/23  0904   AST 18   ALT 19   BILITOT 0.3   ALKPHOS 77     No results for input(s): INR in the last 72 hours.   Recent Labs     03/08/23 0904   TROPONINI <0.01       Urinalysis:    No results found for: NITRU, WBCUA, BACTERIA, RBCUA, BLOODU, SPECGRAV, GLUCOSEU      Consults:    IP CONSULT TO HEART FAILURE NURSE/COORDINATOR  IP CONSULT TO HEART FAILURE NURSE/COORDINATOR  IP CONSULT TO DIETITIAN      ASSESSMENT:    Active Hospital Problems    Diagnosis Date Noted    CHF (congestive heart failure) ([facility] [I50.9] 03/09/2023     Priority: Medium    CHF (congestive heart failure), NYHA class I, acute on chronic, combined ([facility] [I50.43] 03/08/2023     Priority: Medium       PLAN:      CHF - acute diastolic failure clinically suspected w/ Echo ordered and pending. Likely due to hypertensive heart disease. Continue diuresis as currently ordered and follow I/O as well as clinical response. Pulmonary edema on admission CTPA, negative for acute PE. Started on IV diuresis. Tobacco Abuse - active and ongoing. Cessation counseled. Nicotine replacement PRN. DVT Prophylaxis: LMWH  Diet: ADULT DIET; Regular  Code Status: Full Code      PT/OT Eval Status: not yet ordered.      Dispo - Patient is likely to remain in-house at least until Friday/Sat 10/11 March pending clinical course and subspecialty Elma Gandhi MD

## 2023-03-10 VITALS
HEIGHT: 70 IN | RESPIRATION RATE: 18 BRPM | TEMPERATURE: 97.8 F | HEART RATE: 90 BPM | OXYGEN SATURATION: 98 % | WEIGHT: 203.7 LBS | BODY MASS INDEX: 29.16 KG/M2 | DIASTOLIC BLOOD PRESSURE: 105 MMHG | SYSTOLIC BLOOD PRESSURE: 154 MMHG

## 2023-03-10 LAB
ANION GAP SERPL CALCULATED.3IONS-SCNC: 9 MMOL/L (ref 3–16)
BUN BLDV-MCNC: 13 MG/DL (ref 7–20)
CALCIUM SERPL-MCNC: 8.3 MG/DL (ref 8.3–10.6)
CHLORIDE BLD-SCNC: 101 MMOL/L (ref 99–110)
CO2: 24 MMOL/L (ref 21–32)
CREAT SERPL-MCNC: 0.9 MG/DL (ref 0.9–1.3)
GFR SERPL CREATININE-BSD FRML MDRD: >60 ML/MIN/{1.73_M2}
GLUCOSE BLD-MCNC: 94 MG/DL (ref 70–99)
HCT VFR BLD CALC: 35.8 % (ref 40.5–52.5)
HEMOGLOBIN: 11.9 G/DL (ref 13.5–17.5)
MAGNESIUM: 1.8 MG/DL (ref 1.8–2.4)
MCH RBC QN AUTO: 30.6 PG (ref 26–34)
MCHC RBC AUTO-ENTMCNC: 33.1 G/DL (ref 31–36)
MCV RBC AUTO: 92.5 FL (ref 80–100)
PDW BLD-RTO: 13.4 % (ref 12.4–15.4)
PLATELET # BLD: 310 K/UL (ref 135–450)
PMV BLD AUTO: 8.7 FL (ref 5–10.5)
POTASSIUM SERPL-SCNC: 4.5 MMOL/L (ref 3.5–5.1)
RBC # BLD: 3.87 M/UL (ref 4.2–5.9)
SODIUM BLD-SCNC: 134 MMOL/L (ref 136–145)
WBC # BLD: 6.6 K/UL (ref 4–11)

## 2023-03-10 PROCEDURE — 80048 BASIC METABOLIC PNL TOTAL CA: CPT

## 2023-03-10 PROCEDURE — 36415 COLL VENOUS BLD VENIPUNCTURE: CPT

## 2023-03-10 PROCEDURE — 85027 COMPLETE CBC AUTOMATED: CPT

## 2023-03-10 PROCEDURE — 2580000003 HC RX 258: Performed by: INTERNAL MEDICINE

## 2023-03-10 PROCEDURE — 6370000000 HC RX 637 (ALT 250 FOR IP): Performed by: INTERNAL MEDICINE

## 2023-03-10 PROCEDURE — 83735 ASSAY OF MAGNESIUM: CPT

## 2023-03-10 PROCEDURE — 6360000002 HC RX W HCPCS: Performed by: INTERNAL MEDICINE

## 2023-03-10 RX ORDER — FUROSEMIDE 10 MG/ML
40 INJECTION INTRAMUSCULAR; INTRAVENOUS 3 TIMES DAILY
Status: COMPLETED | OUTPATIENT
Start: 2023-03-10 | End: 2023-03-10

## 2023-03-10 RX ORDER — FUROSEMIDE 40 MG/1
40 TABLET ORAL DAILY
Qty: 30 TABLET | Refills: 0 | Status: SHIPPED | OUTPATIENT
Start: 2023-03-10 | End: 2023-04-09

## 2023-03-10 RX ORDER — ALBUTEROL SULFATE 90 UG/1
2 AEROSOL, METERED RESPIRATORY (INHALATION) EVERY 6 HOURS PRN
Qty: 18 G | Refills: 0 | Status: SHIPPED | OUTPATIENT
Start: 2023-03-10 | End: 2023-04-09

## 2023-03-10 RX ADMIN — FUROSEMIDE 40 MG: 10 INJECTION, SOLUTION INTRAMUSCULAR; INTRAVENOUS at 13:55

## 2023-03-10 RX ADMIN — ENOXAPARIN SODIUM 40 MG: 100 INJECTION SUBCUTANEOUS at 08:52

## 2023-03-10 RX ADMIN — FUROSEMIDE 40 MG: 10 INJECTION, SOLUTION INTRAMUSCULAR; INTRAVENOUS at 09:39

## 2023-03-10 RX ADMIN — Medication 10 ML: at 08:53

## 2023-03-10 RX ADMIN — ACETAMINOPHEN 650 MG: 325 TABLET ORAL at 05:08

## 2023-03-10 RX ADMIN — ACETAMINOPHEN 650 MG: 325 TABLET ORAL at 12:03

## 2023-03-10 ASSESSMENT — PAIN SCALES - GENERAL
PAINLEVEL_OUTOF10: 2
PAINLEVEL_OUTOF10: 2

## 2023-03-10 ASSESSMENT — PAIN DESCRIPTION - LOCATION: LOCATION: HEAD

## 2023-03-10 ASSESSMENT — PAIN DESCRIPTION - DESCRIPTORS: DESCRIPTORS: ACHING

## 2023-03-10 NOTE — PLAN OF CARE
Problem: Discharge Planning  Goal: Discharge to home or other facility with appropriate resources  3/10/2023 1359 by Danita Gordon RN  Outcome: Completed  3/10/2023 0949 by Christa Alonzo RN  Outcome: Progressing  3/10/2023 0144 by Lise Chapa RN  Outcome: Progressing     Problem: Cardiovascular - Adult  Goal: Maintains optimal cardiac output and hemodynamic stability  3/10/2023 1359 by Danita Gordon RN  Outcome: Completed  3/10/2023 0949 by Christa Alonzo RN  Outcome: Progressing  3/10/2023 0144 by Lise Chapa RN  Outcome: Progressing  Goal: Absence of cardiac dysrhythmias or at baseline  3/10/2023 1359 by Danita Gordon RN  Outcome: Completed  3/10/2023 0949 by Christa Alonzo RN  Outcome: Progressing  3/10/2023 0144 by Lise Chapa RN  Outcome: Progressing  Flowsheets (Taken 3/10/2023 0144)  Absence of cardiac dysrhythmias or at baseline: Monitor cardiac rate and rhythm  Note: SR on cardiac monitoring     Problem: Metabolic/Fluid and Electrolytes - Adult  Goal: Electrolytes maintained within normal limits  3/10/2023 1359 by Danita Gordon RN  Outcome: Completed  3/10/2023 0949 by Christa Alonzo RN  Outcome: Progressing  3/10/2023 0144 by Lise Chapa RN  Outcome: Progressing  Goal: Hemodynamic stability and optimal renal function maintained  3/10/2023 1359 by Danita Gordon RN  Outcome: Completed  3/10/2023 0949 by Christa Alonzo RN  Outcome: Progressing  3/10/2023 0144 by Lise Chapa RN  Outcome: Progressing     Problem: Chronic Conditions and Co-morbidities  Goal: Patient's chronic conditions and co-morbidity symptoms are monitored and maintained or improved  3/10/2023 1359 by Danita Gordon RN  Outcome: Completed  3/10/2023 0949 by Christa Alonzo RN  Outcome: Progressing  3/10/2023 0144 by Lise Chapa RN  Outcome: Progressing     Problem: Pain  Goal: Verbalizes/displays adequate comfort level or baseline comfort level  3/10/2023 1359 by Danita Gordon RN  Outcome: Completed  3/10/2023 0949 by Farooq Day RN  Outcome: Progressing  3/10/2023 0144 by Juhi Morgan RN  Outcome: Progressing  Note: Denies pain at this time Negative Screen

## 2023-03-10 NOTE — PROGRESS NOTES
Hospitalist Progress Note      PCP: No primary care provider on file. Date of Admission: 3/8/2023    Chief Complaint: SOB w/ Bilateral LE edema    Subjective: no new c/o. Medications:  Reviewed    Infusion Medications    sodium chloride       Scheduled Medications    sodium chloride flush  5-40 mL IntraVENous 2 times per day    enoxaparin  40 mg SubCUTAneous Daily     PRN Meds: albuterol sulfate HFA, sodium chloride flush, sodium chloride, ondansetron **OR** ondansetron, polyethylene glycol, acetaminophen **OR** acetaminophen, perflutren lipid microspheres      Intake/Output Summary (Last 24 hours) at 3/10/2023 0915  Last data filed at 3/10/2023 3893  Gross per 24 hour   Intake 1200 ml   Output 3280 ml   Net -2080 ml       Physical Exam Performed:    BP (!) 149/100   Pulse 76   Temp 97.7 °F (36.5 °C) (Oral)   Resp 18   Ht 5' 10\" (1.778 m)   Wt 203 lb 11.2 oz (92.4 kg)   SpO2 97%   BMI 29.23 kg/m²     General appearance: No apparent distress, appears stated age and cooperative. HEENT: Pupils equal, round, and reactive to light. Conjunctivae/corneas clear. Neck: Supple, with full range of motion. No jugular venous distention. Trachea midline. Respiratory:  Normal respiratory effort. Clear to auscultation, bilaterally without Rales/Wheezes/Rhonchi. Cardiovascular: Regular rate and rhythm with normal S1/S2 without murmurs, rubs or gallops. Abdomen: Soft, non-tender, non-distended with normal bowel sounds. Musculoskeletal: No clubbing, cyanosis or edema bilaterally. Full range of motion without deformity. Skin: Skin color, texture, turgor normal.  No rashes or lesions. Neurologic:  Neurovascularly intact without any focal sensory/motor deficits.  Cranial nerves: II-XII intact, grossly non-focal.  Psychiatric: Alert and oriented, thought content appropriate, normal insight  Capillary Refill: Brisk,< 3 seconds   Peripheral Pulses: +2 palpable, equal bilaterally       Labs:   Recent Labs 03/08/23  0904 03/09/23  0627 03/10/23  0634   WBC 10.8 6.5 6.6   HGB 11.8* 11.7* 11.9*   HCT 35.3* 34.5* 35.8*    340 310     Recent Labs     03/08/23  0904 03/09/23  0627 03/10/23  0634   * 137 134*   K 3.8 3.8 4.5   CL 97* 101 101   CO2 23 27 24   BUN 13 14 13   CREATININE 0.9 1.1 0.9   CALCIUM 9.2 8.5 8.3   PHOS  --  2.7  --      Recent Labs     03/08/23 0904   AST 18   ALT 19   BILITOT 0.3   ALKPHOS 77     No results for input(s): INR in the last 72 hours. Recent Labs     03/08/23 0904   TROPONINI <0.01       Urinalysis:    No results found for: NITRU, WBCUA, BACTERIA, RBCUA, BLOODU, SPECGRAV, GLUCOSEU    Consults:    IP CONSULT TO HEART FAILURE NURSE/COORDINATOR  IP CONSULT TO HEART FAILURE NURSE/COORDINATOR  IP CONSULT TO DIETITIAN      Assessment/Plan:    Active Hospital Problems    Diagnosis     [facility] [I50.9]      Priority: Medium    CHF (congestive heart failure), NYHA class I, acute on chronic, combined ([facility] [I50.43]      Priority: Medium       CHF - acute diastolic failure clinically suspected w/ Echo ordered and pending. Likely due to hypertensive heart disease. Continue diuresis as currently ordered and follow I/O as well as clinical response. Pulmonary edema on admission CTPA, negative for acute PE. Started on IV diuresis. Tobacco Abuse - active and ongoing. Cessation counseled. Nicotine replacement PRN. DVT Prophylaxis: LMWH     Recent Labs     03/08/23  0904 03/09/23  0627 03/10/23  0634    340 310     Diet: ADULT DIET; Regular  Code Status: Full Code      PT/OT Eval Status: not yet ordered.      Dispo - Patient is likely to remain in-house at least until Friday/Sat 10/11 March pending clinical course    Phoebe Bone MD

## 2023-03-10 NOTE — PLAN OF CARE
Problem: Discharge Planning  Goal: Discharge to home or other facility with appropriate resources  3/10/2023 0949 by Gerardo Doty RN  Outcome: Progressing  3/10/2023 0144 by Abbie Akhtar RN  Outcome: Progressing     Problem: Cardiovascular - Adult  Goal: Maintains optimal cardiac output and hemodynamic stability  3/10/2023 0949 by Gerardo Doty RN  Outcome: Progressing  3/10/2023 0144 by Abbie Akhtar RN  Outcome: Progressing  Goal: Absence of cardiac dysrhythmias or at baseline  3/10/2023 0949 by Gerardo Doty RN  Outcome: Progressing  3/10/2023 0144 by Abbie Akhtar RN  Outcome: Progressing  Flowsheets (Taken 3/10/2023 0144)  Absence of cardiac dysrhythmias or at baseline: Monitor cardiac rate and rhythm  Note: SR on cardiac monitoring     Problem: Metabolic/Fluid and Electrolytes - Adult  Goal: Electrolytes maintained within normal limits  3/10/2023 0949 by Gerardo Doty RN  Outcome: Progressing  3/10/2023 0144 by Abbie Akhtar RN  Outcome: Progressing  Goal: Hemodynamic stability and optimal renal function maintained  3/10/2023 0949 by Gerardo Doty RN  Outcome: Progressing  3/10/2023 0144 by Abbie Akhtar RN  Outcome: Progressing     Problem: Chronic Conditions and Co-morbidities  Goal: Patient's chronic conditions and co-morbidity symptoms are monitored and maintained or improved  3/10/2023 0949 by Gerardo Doty RN  Outcome: Progressing  3/10/2023 0144 by Abbie Akhtar RN  Outcome: Progressing     Problem: Pain  Goal: Verbalizes/displays adequate comfort level or baseline comfort level  3/10/2023 0949 by Gerardo Doty RN  Outcome: Progressing  3/10/2023 0144 by Abbie Akhtar RN  Outcome: Progressing  Note: Denies pain at this time

## 2023-03-10 NOTE — CARE COORDINATION
Discharge order noted. Pt from home. IPTA. Denies dcp needs. CM will sign off. Remains available should needs arise.  Paula Guerrero RN

## 2023-03-10 NOTE — PLAN OF CARE
Problem: Discharge Planning  Goal: Discharge to home or other facility with appropriate resources  Outcome: Progressing     Problem: Cardiovascular - Adult  Goal: Maintains optimal cardiac output and hemodynamic stability  Outcome: Progressing  Goal: Absence of cardiac dysrhythmias or at baseline  Outcome: Progressing  Flowsheets (Taken 3/10/2023 0144)  Absence of cardiac dysrhythmias or at baseline: Monitor cardiac rate and rhythm  Note: SR on cardiac monitoring     Problem: Metabolic/Fluid and Electrolytes - Adult  Goal: Electrolytes maintained within normal limits  Outcome: Progressing  Goal: Hemodynamic stability and optimal renal function maintained  Outcome: Progressing     Problem: Chronic Conditions and Co-morbidities  Goal: Patient's chronic conditions and co-morbidity symptoms are monitored and maintained or improved  Outcome: Progressing     Problem: Pain  Goal: Verbalizes/displays adequate comfort level or baseline comfort level  Outcome: Progressing  Note: Denies pain at this time

## 2023-03-10 NOTE — PROGRESS NOTES
Assessment done and recorded. BP elevated, Vital signs stable. A&O x4 . Safety measures in place. Bed locked, alarmed and placed in lowest position. Bedside table and call light within reach. Will continue monitoring pt.

## 2023-03-10 NOTE — PROGRESS NOTES
Patient's EF (Ejection Fraction) is greater than 40%    Heart Failure Medications:  Diuretics[de-identified] Furosemide    (One of the following REQUIRED for EF </= 40%/SYSTOLIC FAILURE but MAY be used in EF% >40%/DIASTOLIC FAILURE)        ACE[de-identified] None        ARB[de-identified] None         ARNI[de-identified] None    (Beta Blockers)  NON- Evidenced Based Beta Blocker (for EF% >40%/DIASTOLIC FAILURE): None    Evidenced Based Beta Blocker::(REQUIRED for EF% <40%/SYSTOLIC FAILURE) None  . .................................................................................................................................................. Patient's weights and intake/output reviewed: Yes    Patient's Last Weight: 203 lbs obtained by standing scale. Difference of 5 lbs less than last documented weight. Intake/Output Summary (Last 24 hours) at 3/10/2023 1340  Last data filed at 3/10/2023 0845  Gross per 24 hour   Intake 960 ml   Output 3210 ml   Net -2250 ml       Daily Weight log at bedside and being used: [facility] Provided: yes    Comorbidities Reviewed Yes    Patient has a past medical history of Asthma. >>For CHF and Comorbidity documentation on Education Time and Topics, please see Education Tab    Progressive Mobility Assessment:  What is this patient's Current Level of Mobility?: Ambulatory-Up Ad Rhianna  How was this patient Mobilized today?: Edge of Bed, Up to Chair,  Up to Toilet/Shower, Up in Room, and Up in Hallway, ambulated 20 ft                 With Whom? Nurse, PCA, and Self                 Level of Difficulty/Assistance: Independent     Pt resting in bed at this time on room air. Pt denies shortness of breath. Pt with nonpitting lower extremity edema.      Patient and/or Family's stated Goal of Care this Admission: reduce shortness of breath, increase activity tolerance, better understand heart failure and disease management, be more comfortable, and reduce lower extremity edema prior to discharge        :

## 2023-03-13 ENCOUNTER — FOLLOWUP TELEPHONE ENCOUNTER (OUTPATIENT)
Dept: TELEMETRY | Age: 38
End: 2023-03-13

## 2023-03-13 NOTE — TELEPHONE ENCOUNTER
Care Transitions Initial Follow Up Call    Call within 2 business days of discharge: Yes     Patient: Marci Gaytan Patient : 1985 MRN: [id number]    [unfilled]    RARS: Readmission Risk Score: 5.5       Spoke with: patient     Discharge department/facility: home    Non-face-to-face services provided:  Scheduled appointment with PCP-7days     Spoke to patient for hospital follow up. Pt states he is monitoring his weight. States that he has not gone up or down a pound either direction. States that he is monitoring diet and fluid restrictions. [facility] is [facility] to [facility]. Denies any needs. Follow Up  No future appointments.     Shayy Miles RN

## 2023-03-13 NOTE — DISCHARGE SUMMARY
Hospital Medicine Discharge Summary    Patient ID: Tan Sandoval      Patient's PCP: No primary care provider on file. Admit Date: 3/8/2023     Discharge Date: 3/10/2023      Admitting Physician: Loren Berry MD     Discharge Physician: Loren Berry MD     Discharge Diagnoses: Active Hospital Problems    Diagnosis     CHF (congestive heart failure) ([facility]) [I50.9]      Priority: Medium    CHF (congestive heart failure), NYHA class I, acute on chronic, combined ([facility] [I50.43]      Priority: Medium       The patient was seen and examined on day of discharge and this discharge summary is in conjunction with any daily progress note from day of discharge. Hospital Course:        CHF - acute diastolic failure clinically suspected w/ Echo ordered w/ preserved EF. Likely due to hypertensive heart disease. Continue diuresis as currently ordered and follow I/O as well as clinical response. Pulmonary edema on admission CTPA, negative for acute PE. Started on IV diuresis w/ good effect and continued PO at discharge to follow up in [facility]. No evidence of renal dysfunction. Tobacco Abuse - active and ongoing. Cessation counseled. Nicotine replacement PRN. Labs: For convenience and continuity at follow-up the following most recent labs are provided:      CBC:    Lab Results   Component Value Date/Time    WBC 6.6 03/10/2023 06:34 AM    HGB 11.9 03/10/2023 06:34 AM    HCT 35.8 03/10/2023 06:34 AM     03/10/2023 06:34 AM       Renal:    Lab Results   Component Value Date/Time     03/10/2023 06:34 AM    K 4.5 03/10/2023 06:34 AM     03/10/2023 06:34 AM    CO2 24 03/10/2023 06:34 AM    BUN 13 03/10/2023 06:34 AM    CREATININE 0.9 03/10/2023 06:34 AM    CALCIUM 8.3 03/10/2023 06:34 AM    PHOS 2.7 03/09/2023 06:27 AM         Significant Diagnostic Studies    Radiology:   CT CHEST PULMONARY EMBOLISM W CONTRAST   Final Result   1. Negative for pulmonary embolus.    2. CHF with pulmonary edema and or atypical multifocal pneumonia. XR CHEST (2 VW)   Final Result   No acute cardiopulmonary findings                Consults:     IP CONSULT TO HEART FAILURE NURSE/COORDINATOR  IP CONSULT TO HEART FAILURE NURSE/COORDINATOR  IP CONSULT TO DIETITIAN    Disposition: Home     Condition at Discharge: Stable    Discharge Instructions/Follow-up:  w/ PCP 1-2 weeks and subspecialists as arranged. Code Status:  Full Code    Activity: activity as tolerated    Diet: regular diet      Discharge Medications:     Discharge Medication List as of 3/10/2023  2:50 PM             Details   furosemide (LASIX) 40 MG tablet Take 1 tablet by mouth daily, Disp-30 tablet, R-0Print                Details   albuterol sulfate HFA (PROVENTIL;VENTOLIN;PROAIR) 108 (90 Base) MCG/ACT inhaler Inhale 2 puffs into the lungs every 6 hours as needed for Shortness of Breath, Disp-18 g, R-0Print             Time Spent on discharge is more than 33 minutes in the examination, evaluation, counseling and review of medications and discharge plan. Signed:    Janie Gabriel MD   3/13/2023      Thank you No primary care provider on file. for the opportunity to be involved in this patient's care. If you have any questions or concerns please feel free to contact me at [phone].

## 2023-03-28 ENCOUNTER — OFFICE VISIT (OUTPATIENT)
Dept: INTERNAL MEDICINE CLINIC | Age: 38
End: 2023-03-28

## 2023-03-28 VITALS
HEIGHT: 70 IN | WEIGHT: 200 LBS | DIASTOLIC BLOOD PRESSURE: 80 MMHG | BODY MASS INDEX: 28.63 KG/M2 | SYSTOLIC BLOOD PRESSURE: 140 MMHG | HEART RATE: 87 BPM | OXYGEN SATURATION: 98 %

## 2023-03-28 DIAGNOSIS — I50.43 CHF (CONGESTIVE HEART FAILURE), NYHA CLASS I, ACUTE ON CHRONIC, COMBINED (HCC): Primary | ICD-10-CM

## 2023-03-28 DIAGNOSIS — I10 HYPERTENSION, UNSPECIFIED TYPE: ICD-10-CM

## 2023-03-28 PROCEDURE — 3079F DIAST BP 80-89 MM HG: CPT | Performed by: FAMILY MEDICINE

## 2023-03-28 PROCEDURE — 99203 OFFICE O/P NEW LOW 30 MIN: CPT | Performed by: FAMILY MEDICINE

## 2023-03-28 PROCEDURE — 3077F SYST BP >= 140 MM HG: CPT | Performed by: FAMILY MEDICINE

## 2023-03-28 RX ORDER — FUROSEMIDE 40 MG/1
40 TABLET ORAL DAILY
Qty: 30 TABLET | Refills: 2 | Status: SHIPPED | OUTPATIENT
Start: 2023-03-28 | End: 2023-04-27

## 2023-03-28 RX ORDER — CETIRIZINE HYDROCHLORIDE 10 MG/1
10 TABLET ORAL DAILY
COMMUNITY

## 2023-03-28 ASSESSMENT — PATIENT HEALTH QUESTIONNAIRE - PHQ9
SUM OF ALL RESPONSES TO PHQ QUESTIONS 1-9: 0
1. LITTLE INTEREST OR PLEASURE IN DOING THINGS: 0
SUM OF ALL RESPONSES TO PHQ QUESTIONS 1-9: 0
SUM OF ALL RESPONSES TO PHQ QUESTIONS 1-9: 0
2. FEELING DOWN, DEPRESSED OR HOPELESS: 0
SUM OF ALL RESPONSES TO PHQ QUESTIONS 1-9: 0
SUM OF ALL RESPONSES TO PHQ9 QUESTIONS 1 & 2: 0

## 2023-03-28 NOTE — PROGRESS NOTES
Chief Complaint   Patient presents with    Follow-Up from Hospital     CHF, went in for ankle edema and SOB     Blood Pressure Check       Pt in with hospital f/u, see dc summary copied below. CHF with pulmonary edema. BNP significant elevated 4600. Presented with SOB and LE edema. BPs in hospital consistently elevated 150s/100-110  range. Weight decr from 215 to 203 while in hospital.    Doing well since dc home on lasix. Wts stable 199-202 since d/c. Has not checked any BPs      Admit Date: 3/8/2023      Discharge Date: 3/10/2023       Admitting Physician: Uma Christine MD     Discharge Physician: Uma Christine MD      Discharge Diagnoses: Active Hospital Problems     Diagnosis      CHF (congestive heart failure) ([facility]) [I50.9]         Priority: Medium    CHF (congestive heart failure), NYHA class I, acute on chronic, combined [facility] [I50.43]         Priority: Medium      The patient was seen and examined on day of discharge and this discharge summary is in conjunction with any daily progress note from day of discharge. Hospital Course:      CHF - acute diastolic failure clinically suspected w/ Echo ordered w/ preserved EF. Likely due to hypertensive heart disease. Continue diuresis as currently ordered and follow I/O as well as clinical response. Pulmonary edema on admission CTPA, negative for acute PE. Started on IV diuresis w/ good effect and continued PO at discharge to follow up in [facility]. No evidence of renal dysfunction. Tobacco Abuse - active and ongoing. Cessation counseled. Nicotine replacement PRN.       BP (!) 140/80   Pulse 87   Ht 5' 10\" (1.778 m)   Wt 200 lb (90.7 kg)   SpO2 98%   BMI 28.70 kg/m²   Repeaet /80    A+Ox4, NAD, WD/WN  Conj clear, no icterus  OP clear  Neck supple, no LAD  Lungs CTA B  CV: RRR, no M/R/G, nl S1S  Abd: soft, NT/ND  Extr: No edema  Skin: warm dry, no rash no obvious lesions    Assessment/plan:     Joana Kramer was seen

## 2023-05-23 ENCOUNTER — OFFICE VISIT (OUTPATIENT)
Dept: INTERNAL MEDICINE CLINIC | Age: 38
End: 2023-05-23

## 2023-05-23 VITALS
SYSTOLIC BLOOD PRESSURE: 128 MMHG | BODY MASS INDEX: 28.63 KG/M2 | WEIGHT: 200 LBS | HEART RATE: 76 BPM | HEIGHT: 70 IN | DIASTOLIC BLOOD PRESSURE: 80 MMHG | OXYGEN SATURATION: 98 %

## 2023-05-23 DIAGNOSIS — I50.43 CHF (CONGESTIVE HEART FAILURE), NYHA CLASS I, ACUTE ON CHRONIC, COMBINED (HCC): ICD-10-CM

## 2023-05-23 DIAGNOSIS — I10 HYPERTENSION, UNSPECIFIED TYPE: Primary | ICD-10-CM

## 2023-05-23 PROCEDURE — 3079F DIAST BP 80-89 MM HG: CPT | Performed by: FAMILY MEDICINE

## 2023-05-23 PROCEDURE — 99213 OFFICE O/P EST LOW 20 MIN: CPT | Performed by: FAMILY MEDICINE

## 2023-05-23 PROCEDURE — 3074F SYST BP LT 130 MM HG: CPT | Performed by: FAMILY MEDICINE

## 2023-05-23 RX ORDER — LISINOPRIL 10 MG/1
10 TABLET ORAL DAILY
Qty: 30 TABLET | Refills: 5 | Status: SHIPPED | OUTPATIENT
Start: 2023-05-23

## 2023-05-23 RX ORDER — FUROSEMIDE 40 MG/1
40 TABLET ORAL DAILY
Qty: 30 TABLET | Refills: 3 | Status: SHIPPED | OUTPATIENT
Start: 2023-05-23 | End: 2023-06-22

## 2023-05-23 NOTE — PROGRESS NOTES
Chief Complaint   Patient presents with    Hypertension     See readings scanned into media     Congestive Heart Failure     F/u last visit- was admitted back early march suspected acute diastolic HF, thought due to likely hypertensive heart disease. Weights stable  Home BPs 130-150s/80-90s  Feeling well, no acute c/o. No dyspnea, ESPINOSA, CP.   ROS neg    /80   Pulse 76   Ht 5' 10\" (1.778 m)   Wt 200 lb (90.7 kg)   SpO2 98%   BMI 28.70 kg/m²     A+Ox4, NAD, WD/WN  Conj clear, no icterus  OP clear  Neck supple, no LAD  Lungs CTA B  CV: RRR, no M/R/G, nl S1S  Abd: soft, NT/ND  Extr: No edema  Skin: warm dry, no rash no obvious lesions    Assessment/plan:     Aydee Tesfaye was seen today for hypertension and congestive heart failure. Diagnoses and all orders for this visit:    Hypertension, unspecified type    CHF (congestive heart failure), NYHA class I, acute on chronic, combined ([facility]      Stable doing well- echo reviewed from hospitalization, is ok  Will add lisinopril to improved BP control. Pt educ  RTO 3 mo    Other orders  -     lisinopril (PRINIVIL;ZESTRIL) 10 MG tablet; Take 1 tablet by mouth daily  -     furosemide (LASIX) 40 MG tablet;  Take 1 tablet by mouth daily

## 2023-08-15 ENCOUNTER — OFFICE VISIT (OUTPATIENT)
Dept: INTERNAL MEDICINE CLINIC | Age: 38
End: 2023-08-15

## 2023-08-15 VITALS
HEIGHT: 70 IN | HEART RATE: 76 BPM | DIASTOLIC BLOOD PRESSURE: 70 MMHG | SYSTOLIC BLOOD PRESSURE: 110 MMHG | BODY MASS INDEX: 28.35 KG/M2 | OXYGEN SATURATION: 97 % | WEIGHT: 198 LBS

## 2023-08-15 DIAGNOSIS — I50.43 CHF (CONGESTIVE HEART FAILURE), NYHA CLASS I, ACUTE ON CHRONIC, COMBINED (HCC): ICD-10-CM

## 2023-08-15 DIAGNOSIS — I10 HYPERTENSION, UNSPECIFIED TYPE: Primary | ICD-10-CM

## 2023-08-15 PROCEDURE — 99213 OFFICE O/P EST LOW 20 MIN: CPT | Performed by: FAMILY MEDICINE

## 2023-08-15 PROCEDURE — 3078F DIAST BP <80 MM HG: CPT | Performed by: FAMILY MEDICINE

## 2023-08-15 PROCEDURE — 3074F SYST BP LT 130 MM HG: CPT | Performed by: FAMILY MEDICINE

## 2023-08-15 RX ORDER — LISINOPRIL 10 MG/1
10 TABLET ORAL DAILY
Qty: 90 TABLET | Refills: 1 | Status: SHIPPED | OUTPATIENT
Start: 2023-08-15

## 2023-09-14 RX ORDER — ALBUTEROL SULFATE 90 UG/1
2 AEROSOL, METERED RESPIRATORY (INHALATION) EVERY 6 HOURS PRN
Qty: 18 G | Refills: 5 | Status: SHIPPED | OUTPATIENT
Start: 2023-09-14 | End: 2023-12-24

## 2023-09-14 NOTE — TELEPHONE ENCOUNTER
Requested Prescriptions     Pending Prescriptions Disp Refills    albuterol sulfate HFA (PROVENTIL;VENTOLIN;PROAIR) 108 (90 Base) MCG/ACT inhaler 18 g 0     Sig: Inhale 2 puffs into the lungs every 6 hours as needed for Shortness of Breath        Asking for albuterol for his nebulizer as well

## 2023-10-31 NOTE — TELEPHONE ENCOUNTER
Requested Prescriptions     Pending Prescriptions Disp Refills    furosemide (LASIX) 40 MG tablet 90 tablet 0     Sig: Take 1 tablet by mouth daily    Pt seen in august [facility] pending for CHF

## 2023-11-01 RX ORDER — FUROSEMIDE 40 MG/1
40 TABLET ORAL DAILY
Qty: 90 TABLET | Refills: 0 | Status: SHIPPED | OUTPATIENT
Start: 2023-11-01 | End: 2024-01-30

## 2024-01-29 RX ORDER — FUROSEMIDE 40 MG/1
40 TABLET ORAL DAILY
Qty: 30 TABLET | Refills: 0 | Status: SHIPPED | OUTPATIENT
Start: 2024-01-29 | End: 2024-02-28

## 2024-01-29 NOTE — TELEPHONE ENCOUNTER
Requested Prescriptions     Pending Prescriptions Disp Refills    furosemide (LASIX) 40 MG tablet [Pharmacy Med Name: FUROSEMIDE 40 MG TABLET] 30 tablet 0     Sig: TAKE 1 TABLET BY MOUTH DAILY      Due for  a lab draw to check sodium level

## 2024-02-13 ENCOUNTER — OFFICE VISIT (OUTPATIENT)
Dept: INTERNAL MEDICINE CLINIC | Age: 39
End: 2024-02-13

## 2024-02-13 VITALS
OXYGEN SATURATION: 98 % | SYSTOLIC BLOOD PRESSURE: 110 MMHG | HEIGHT: 70 IN | DIASTOLIC BLOOD PRESSURE: 60 MMHG | BODY MASS INDEX: 30.06 KG/M2 | HEART RATE: 71 BPM | WEIGHT: 210 LBS

## 2024-02-13 DIAGNOSIS — I50.9 CONGESTIVE HEART FAILURE, UNSPECIFIED HF CHRONICITY, UNSPECIFIED HEART FAILURE TYPE (HCC): ICD-10-CM

## 2024-02-13 DIAGNOSIS — I10 HYPERTENSION, UNSPECIFIED TYPE: Primary | ICD-10-CM

## 2024-02-13 DIAGNOSIS — J45.20 MILD INTERMITTENT ASTHMA WITHOUT COMPLICATION: ICD-10-CM

## 2024-02-13 PROCEDURE — 3074F SYST BP LT 130 MM HG: CPT | Performed by: FAMILY MEDICINE

## 2024-02-13 PROCEDURE — 99213 OFFICE O/P EST LOW 20 MIN: CPT | Performed by: FAMILY MEDICINE

## 2024-02-13 PROCEDURE — 3078F DIAST BP <80 MM HG: CPT | Performed by: FAMILY MEDICINE

## 2024-02-13 RX ORDER — LISINOPRIL 10 MG/1
10 TABLET ORAL DAILY
Qty: 90 TABLET | Refills: 2 | Status: SHIPPED | OUTPATIENT
Start: 2024-02-13

## 2024-02-13 RX ORDER — LEVALBUTEROL INHALATION SOLUTION 0.63 MG/3ML
1 SOLUTION RESPIRATORY (INHALATION) EVERY 8 HOURS PRN
Qty: 30 EACH | Refills: 3 | Status: SHIPPED | OUTPATIENT
Start: 2024-02-13

## 2024-02-13 RX ORDER — FUROSEMIDE 40 MG/1
40 TABLET ORAL DAILY
Qty: 90 TABLET | Refills: 2 | Status: SHIPPED | OUTPATIENT
Start: 2024-02-13 | End: 2024-11-09

## 2024-02-13 RX ORDER — LEVALBUTEROL INHALATION SOLUTION 0.63 MG/3ML
1 SOLUTION RESPIRATORY (INHALATION) EVERY 8 HOURS PRN
COMMUNITY
End: 2024-02-13 | Stop reason: SDUPTHER

## 2024-02-13 NOTE — PROGRESS NOTES
Chief Complaint   Patient presents with    Hypertension     6 month follow up, no new complaints     Medication Refill     ADRIN for albuterol      Has been doing well, feeling well.  No acute c/o today.      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  Doing well with meds noted, taking daily.   Wts stable overall.   No edema.  No SOB/ESPINOSA  Would like rf of alb neb sol'n due to hx of occ flares of asthma    Tobacco about 1/2 ppd.  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      Past Medical History:   Diagnosis Date    Asthma      Patient Active Problem List   Diagnosis    CHF (congestive heart failure), NYHA class I, acute on chronic, combined (HCC)    CHF (congestive heart failure) ([facility])    Hypertension    Mild intermittent asthma without complication     Current Outpatient Medications on File Prior to Visit   Medication Sig Dispense Refill    levalbuterol (XOPENEX) 0.63 MG/3ML nebulization Take 3 mLs by nebulization every 8 hours as needed for Wheezing      furosemide (LASIX) 40 MG tablet TAKE 1 TABLET BY MOUTH DAILY 30 tablet 0    lisinopril (PRINIVIL;ZESTRIL) 10 MG tablet Take 1 tablet by mouth daily 90 tablet 1    cetirizine (ZYRTEC) 10 MG tablet Take 1 tablet by mouth daily      albuterol sulfate HFA (PROVENTIL;VENTOLIN;PROAIR) 108 (90 Base) MCG/ACT inhaler Inhale 2 puffs into the lungs every 6 hours as needed for Shortness of Breath 18 g 5    albuterol (PROVENTIL) (5 MG/ML) 0.5% nebulizer solution Take 1 mL by nebulization 4 times daily as needed for Wheezing (Patient not taking: Reported on 2/13/2024) 120 each 5     No current facility-administered medications on file prior to visit.     /60 (Site: Left Upper Arm, Position: Sitting, Cuff Size: Large Adult)   Pulse 71   Ht 1.778 m (5' 10\")   Wt 95.3 kg (210 lb)   SpO2 98%   BMI 30.13 kg/m²     A+Ox4, NAD, WD/WN  Conj clear, no icterus  OP clear  Neck supple, no LAD  Lungs CTA B  CV: RRR, no M/R/G, nl S1S, no

## 2024-08-05 ENCOUNTER — OFFICE VISIT (OUTPATIENT)
Dept: INTERNAL MEDICINE CLINIC | Age: 39
End: 2024-08-05

## 2024-08-05 VITALS
OXYGEN SATURATION: 95 % | SYSTOLIC BLOOD PRESSURE: 128 MMHG | WEIGHT: 198 LBS | HEIGHT: 70 IN | BODY MASS INDEX: 28.35 KG/M2 | DIASTOLIC BLOOD PRESSURE: 78 MMHG | HEART RATE: 79 BPM

## 2024-08-05 DIAGNOSIS — I50.43 CHF (CONGESTIVE HEART FAILURE), NYHA CLASS I, ACUTE ON CHRONIC, COMBINED (HCC): ICD-10-CM

## 2024-08-05 DIAGNOSIS — I10 PRIMARY HYPERTENSION: Primary | ICD-10-CM

## 2024-08-05 PROCEDURE — 99213 OFFICE O/P EST LOW 20 MIN: CPT | Performed by: INTERNAL MEDICINE

## 2024-08-05 PROCEDURE — 3074F SYST BP LT 130 MM HG: CPT | Performed by: INTERNAL MEDICINE

## 2024-08-05 PROCEDURE — 3078F DIAST BP <80 MM HG: CPT | Performed by: INTERNAL MEDICINE

## 2024-08-05 RX ORDER — LISINOPRIL 10 MG/1
10 TABLET ORAL DAILY
Qty: 90 TABLET | Refills: 2 | Status: SHIPPED | OUTPATIENT
Start: 2024-08-05

## 2024-08-05 RX ORDER — FUROSEMIDE 20 MG/1
40 TABLET ORAL DAILY
Qty: 90 TABLET | Refills: 2 | Status: SHIPPED | OUTPATIENT
Start: 2024-08-05 | End: 2025-05-02

## 2024-08-05 RX ORDER — LEVALBUTEROL INHALATION SOLUTION 0.63 MG/3ML
1 SOLUTION RESPIRATORY (INHALATION) EVERY 8 HOURS PRN
Qty: 30 EACH | Refills: 5 | Status: CANCELLED | OUTPATIENT
Start: 2024-08-05

## 2024-08-05 ASSESSMENT — ENCOUNTER SYMPTOMS
ALLERGIC/IMMUNOLOGIC NEGATIVE: 1
RESPIRATORY NEGATIVE: 1
GASTROINTESTINAL NEGATIVE: 1
EYES NEGATIVE: 1

## 2024-08-05 NOTE — ASSESSMENT & PLAN NOTE
No symptoms now that BP controlled. No CP, PAlp, SOB. No syncope but some mild orthostasis w/ Furosemide.

## 2024-08-05 NOTE — PATIENT INSTRUCTIONS
Assessment and Plan     Primary Hypertension.Continue present meds but will decrease Furosemide to 20 mg daily. Home BP checks. Call if>140/90. Improve diet. Avoid caffeine and salt. Call if new c/o w/ meds.     Chf (Congestive Heart Failure), Nyha Class I, Acute On Chronic, Combined (Hcc). Will decrease Furosemide as above. Call if new c/o of leg swelling, Shortness of Breath, Chest pain.           Ramon Alonzo MD

## 2024-08-05 NOTE — ASSESSMENT & PLAN NOTE
No change in meds, no c/o with meds, no chest pain, SOB, palpatations, or syncope. Home bp was 130s/80s. On Lisinopril and furosemide.    No - the patient is unable to be screened due to medical condition

## 2024-08-05 NOTE — PROGRESS NOTES
Christian Bang presents today for      Primary hypertension  No change in meds, no c/o with meds, no chest pain, SOB, palpatations, or syncope. Home bp was 130s/80s. On Lisinopril and furosemide.     CHF (congestive heart failure), NYHA class I, acute on chronic, combined (HCC)   No symptoms now that BP controlled. No CP, PAlp, SOB. No syncope but some mild orthostasis w/ Furosemide.      Review of Systems   Constitutional: Negative.    HENT: Negative.     Eyes: Negative.    Respiratory: Negative.     Cardiovascular: Negative.    Gastrointestinal: Negative.    Endocrine: Negative.    Genitourinary: Negative.    Musculoskeletal: Negative.    Allergic/Immunologic: Negative.    Neurological: Negative.    Hematological: Negative.       Vitals:    08/05/24 1448 08/05/24 1505   BP: (!) 144/90 128/78   Pulse: 79    SpO2: 95%    Weight: 89.8 kg (198 lb)    Height: 1.778 m (5' 10\")             Physical Exam  Constitutional:       General: He is not in acute distress.     Appearance: Normal appearance. He is well-developed. He is not diaphoretic.   HENT:      Head: Normocephalic and atraumatic.   Neck:      Thyroid: No thyroid mass or thyromegaly.      Vascular: Normal carotid pulses. No carotid bruit, hepatojugular reflux or JVD.      Trachea: Trachea and phonation normal.   Cardiovascular:      Rate and Rhythm: Normal rate and regular rhythm. No extrasystoles are present.     Chest Wall: PMI is not displaced.      Pulses: Normal pulses. No decreased pulses.           Carotid pulses are 2+ on the right side and 2+ on the left side.       Radial pulses are 2+ on the right side and 2+ on the left side.        Femoral pulses are 2+ on the right side and 2+ on the left side.       Popliteal pulses are 2+ on the right side and 2+ on the left side.        Dorsalis pedis pulses are 2+ on the right side and 2+ on the left side.        Posterior tibial pulses are 2+ on the right side and 2+ on the left side.      Heart sounds: Normal

## 2024-08-06 ENCOUNTER — CLINICAL DOCUMENTATION (OUTPATIENT)
Facility: HOSPITAL | Age: 39
End: 2024-08-06

## 2024-08-06 NOTE — PROGRESS NOTES
Patient Assistance    Met with: Christian Bang on 8/5/2024 in person at [facility]    Navigator Type: Oral  Documentation Type: New Patient  Contact Type: In-person  Status of Patient Insurance Coverage: Patient does not have active coverage          Additional notes: Completed the Medicaid application and provided it to the patient to turn into the [facility] [employer].

## 2024-08-06 NOTE — PROGRESS NOTES
Reviewed chart again for medications, he is getting his prescriptions filled at [facility].  Called to see how much he is paying, maybe able to get him a GoodRx card.     No answer, unable to leave a message.  Will try again.     Pamela Couch   Financial Navigator  [phone]

## 2024-09-12 ENCOUNTER — CLINICAL DOCUMENTATION (OUTPATIENT)
Facility: HOSPITAL | Age: 39
End: 2024-09-12

## 2024-09-30 RX ORDER — ALBUTEROL SULFATE 90 UG/1
2 INHALANT RESPIRATORY (INHALATION) EVERY 6 HOURS PRN
Qty: 18 G | Refills: 5 | Status: SHIPPED | OUTPATIENT
Start: 2024-09-30 | End: 2025-01-09

## 2024-09-30 NOTE — TELEPHONE ENCOUNTER
Requested Prescriptions     Pending Prescriptions Disp Refills    albuterol sulfate HFA (PROVENTIL;VENTOLIN;PROAIR) 108 (90 Base) MCG/ACT inhaler 18 g 5     Sig: Inhale 2 puffs into the lungs every 6 hours as needed for Shortness of Breath    Follow up scheduled 11/11/2024

## 2024-10-15 ENCOUNTER — HOSPITAL ENCOUNTER (OUTPATIENT)
Dept: ULTRASOUND IMAGING | Age: 39
Discharge: HOME | End: 2024-10-15

## 2024-10-15 DIAGNOSIS — N45.1 EPIDIDYMITIS: ICD-10-CM

## 2024-10-15 PROCEDURE — 76870 US EXAM SCROTUM: CPT

## 2025-03-31 RX ORDER — FUROSEMIDE 20 MG/1
40 TABLET ORAL DAILY
Qty: 90 TABLET | Refills: 0 | Status: SHIPPED | OUTPATIENT
Start: 2025-03-31 | End: 2025-12-26

## 2025-03-31 NOTE — TELEPHONE ENCOUNTER
Requested Prescriptions     Pending Prescriptions Disp Refills    furosemide (LASIX) 20 MG tablet [Pharmacy Med Name: FUROSEMIDE 20 MG TABLET] 90 tablet 2     Sig: TAKE 2 TABLETS BY MOUTH DAILY    Due for labs

## 2025-04-21 RX ORDER — LISINOPRIL 10 MG/1
10 TABLET ORAL DAILY
Qty: 90 TABLET | Refills: 0 | Status: SHIPPED | OUTPATIENT
Start: 2025-04-21

## 2025-04-21 NOTE — TELEPHONE ENCOUNTER
Requested Prescriptions     Pending Prescriptions Disp Refills    lisinopril (PRINIVIL;ZESTRIL) 10 MG tablet [Pharmacy Med Name: LISINOPRIL 10 MG TABLET] 90 tablet 2     Sig: TAKE 1 TABLET BY MOUTH DAILY      LOV was 08/05/2024  Due for labs

## 2025-04-24 RX ORDER — ALBUTEROL SULFATE 5 MG/ML
5 SOLUTION RESPIRATORY (INHALATION) 4 TIMES DAILY PRN
Qty: 120 EACH | Refills: 0 | Status: SHIPPED | OUTPATIENT
Start: 2025-04-24

## 2025-04-24 NOTE — TELEPHONE ENCOUNTER
Requested Prescriptions     Pending Prescriptions Disp Refills    albuterol (PROVENTIL) (5 MG/ML) 0.5% nebulizer solution 120 each 5     Sig: Take 1 mL by nebulization 4 times daily as needed for Wheezing    Due for a follow up

## 2025-07-14 ENCOUNTER — TELEPHONE (OUTPATIENT)
Dept: INTERNAL MEDICINE CLINIC | Age: 40
End: 2025-07-14

## 2025-07-14 RX ORDER — FUROSEMIDE 20 MG/1
40 TABLET ORAL DAILY
Qty: 90 TABLET | Refills: 0 | OUTPATIENT
Start: 2025-07-14 | End: 2026-04-10

## 2025-07-14 NOTE — TELEPHONE ENCOUNTER
Received a fax for a refill on his Furosemide. Patient last seen at the clinic in August. Due for a follow up. Called the number on file and said it was the wrong #

## 2025-08-13 RX ORDER — LISINOPRIL 10 MG/1
10 TABLET ORAL DAILY
Qty: 90 TABLET | Refills: 0 | Status: CANCELLED | OUTPATIENT
Start: 2025-08-13